# Patient Record
Sex: MALE | Race: WHITE | NOT HISPANIC OR LATINO | Employment: FULL TIME | URBAN - METROPOLITAN AREA
[De-identification: names, ages, dates, MRNs, and addresses within clinical notes are randomized per-mention and may not be internally consistent; named-entity substitution may affect disease eponyms.]

---

## 2017-04-14 PROCEDURE — 302875 HCHG BANDAGE ACE 4 OR 6""

## 2017-04-14 PROCEDURE — 99284 EMERGENCY DEPT VISIT MOD MDM: CPT

## 2017-04-15 ENCOUNTER — HOSPITAL ENCOUNTER (EMERGENCY)
Facility: MEDICAL CENTER | Age: 27
End: 2017-04-15
Attending: EMERGENCY MEDICINE
Payer: MEDICAID

## 2017-04-15 ENCOUNTER — APPOINTMENT (OUTPATIENT)
Dept: RADIOLOGY | Facility: MEDICAL CENTER | Age: 27
End: 2017-04-15
Attending: EMERGENCY MEDICINE
Payer: MEDICAID

## 2017-04-15 VITALS
HEIGHT: 70 IN | RESPIRATION RATE: 16 BRPM | WEIGHT: 192.46 LBS | SYSTOLIC BLOOD PRESSURE: 128 MMHG | TEMPERATURE: 98.8 F | HEART RATE: 78 BPM | DIASTOLIC BLOOD PRESSURE: 87 MMHG | BODY MASS INDEX: 27.55 KG/M2 | OXYGEN SATURATION: 97 %

## 2017-04-15 DIAGNOSIS — S83.004A DISLOCATION OF RIGHT PATELLA, INITIAL ENCOUNTER: ICD-10-CM

## 2017-04-15 PROCEDURE — 73562 X-RAY EXAM OF KNEE 3: CPT | Mod: RT

## 2017-04-15 ASSESSMENT — LIFESTYLE VARIABLES: DO YOU DRINK ALCOHOL: NO

## 2017-04-15 NOTE — ED NOTES
Tech at bedside to apply ace wrap and provide crutches. Pt tolerated well. Teaching instructions provided. Pt voiced understanding.

## 2017-04-15 NOTE — ED NOTES
Discharge instructions given. All questions answered. Pt to follow up with as  needed. Pt verbalizing understanding. All belongings with pt. Pt requesting cab voucher. SW contacted. Pt has Medicaid and can use MTM. Pt ambulated to lobby.

## 2017-04-15 NOTE — ED AVS SNAPSHOT
Home Care Instructions                                                                                                                Maximino Walters   MRN: [id number]    Department:  [facility], Emergency Dept   Date of Visit:  4/14/2017            [facility], Emergency Dept    [address]    [place] [address]    Phone:  [phone]      You were seen by     Luis E Edmondson M.D.      Your Diagnosis Was     Dislocation of right patella, initial encounter     S83.004A       Follow-up Information     1. Schedule an appointment as soon as possible for a visit with Dickson Muniz M.D..    Specialty:  Orthopaedics    Contact information    [address]  [address]  [facility] [address]  [phone]        Medication Information     Review all of your home medications and newly ordered medications with your primary doctor and/or pharmacist as soon as possible. Follow medication instructions as directed by your doctor and/or pharmacist.     Please keep your complete medication list with you and share with your physician. Update the information when medications are discontinued, doses are changed, or new medications (including over-the-counter products) are added; and carry medication information at all times in the event of emergency situations.               Medication List      ASK your doctor about these medications        Instructions    Morning Afternoon Evening Bedtime    ABILIFY 9.75 MG/1.3ML injection   Generic drug:  aripiprazole        by Intramuscular route every 30 days.                        benztropine 1 MG Tabs   Commonly known as:  COGENTIN        Take 1 Tab by mouth every day.   Dose:  1 mg                        clonazepam 0.5 MG Tabs   Commonly known as:  KLONOPIN        Take 1 Tab by mouth 2 times a day.   Dose:  0.5 mg                        diphenhydrAMINE 50 MG Caps   Commonly known as:  BENADRYL        Take 1 Cap by mouth every 8 hours as needed.   Dose:  50  "mg                                Procedures and tests performed during your visit     CRUTCHES    DX-KNEE 3 VIEWS RIGHT    ELASTIC WRAPS 4\"        Discharge Instructions       Patellar Dislocation  A patellar dislocation occurs when your kneecap (patella) slips out of its normal position in a groove in front of the lower end of your thighbone (femur). This groove is called the patellofemoral groove.   CAUSES  The kneecap is normally positioned over the front of the knee joint at the base of the thighbone. A kneecap can be dislocated when:  · The kneecap is out of place (patellar tracking disorder), and force is applied.  · The foot is firmly planted pointing outward, and the knee bends with the thigh turned inward. This kind of injury is common during many sports activities.  · The inner edge of the kneecap is hit, pushing it toward the outer side of the leg.  SIGNS AND SYMPTOMS  · Severe pain.  · A misshapen knee that looks like a bone is out of position.  · A popping sensation, followed by a feeling that something is out of place.  · Inability to bend or straighten the knee.  · Knee swelling.  · Cool, pale skin or numbness and tingling in or below the affected knee.  DIAGNOSIS   Your health care provider will physically examine the injured area. An X-ray exam may be done to make sure a bone fracture has not occurred. In some cases, your health care provider may look inside your knee joint with an instrument much like a pencil-sized telescope (arthroscope). This may be done to make sure you have no loose cartilage in your joint. Loose cartilage is not visible on an X-ray image.  TREATMENT   In many instances, the patella can be guided back into position without much difficulty. It often goes back into position by straightening the leg. Often, nothing more may be needed other than a brief period of immobilization followed by the exercises your health care provider recommends. If patellar dislocation starts to " become frequent after the first incident, surgery may be needed to prevent your patella from slipping out of place.  HOME CARE INSTRUCTIONS   · Only take over-the-counter or prescription medicines for pain, discomfort, or fever as directed by your health care provider.  · Use a knee brace if directed to do so by your health care provider.  · Use crutches as instructed.  · Apply ice to the injured knee:  ¨ Put ice in a plastic bag.  ¨ Place a towel between your skin and the bag.  ¨ Leave the ice on for 20 minutes, 2-3 times a day.  · Follow your health care provider's instructions for doing any recommended range-of-motion exercises or other exercises.  SEEK IMMEDIATE MEDICAL CARE IF:  · You have increased pain or swelling in the knee that is not relieved with medicine.  · You have increasing inflammation in the knee.  · You have locking or catching of your knee.  MAKE SURE YOU:  · Understand these instructions.  · Will watch your condition.  · Will get help right away if you are not doing well or get worse.     This information is not intended to replace advice given to you by your health care provider. Make sure you discuss any questions you have with your health care provider.     Document Released: 09/12/2002 Document Revised: 10/08/2014 Document Reviewed: 07/30/2014  Elsevier Interactive Patient Education ©2016 Elsevier Inc.            Patient Information     Patient Information    Following emergency treatment: all patient requiring follow-up care must return either to a private physician or a clinic if your condition worsens before you are able to obtain further medical attention, please return to the emergency room.     Billing Information    At [facility], we work to make the billing process streamlined for our patients.  Our Representatives are here to answer any questions you may have regarding your hospital bill.  If you have insurance coverage and have supplied your insurance information to us, we will  submit a claim to your insurer on your behalf.  Should you have any questions regarding your bill, we can be reached online or by phone as follows:  Online: You are able pay your bills online or live chat with our representatives about any billing questions you may have. We are here to help Monday - Friday from 8:00am to 7:30pm and 9:00am - 12:00pm on Saturdays.  Please visit [email or web address]  for more information.   Phone:  [phone] or [phone]    Please note that your emergency physician, surgeon, pathologist, radiologist, anesthesiologist, and other specialists are not employed by [facility] and will therefore bill separately for their services.  Please contact them directly for any questions concerning their bills at the numbers below:     Emergency Physician Services:  [phone]  [facility]:  [phone]  [facility]:  [phone]  [facility]:  [phone]    1. Your final bill may vary from the amount quoted upon discharge if all procedures are not complete at that time, or if your doctor has additional procedures of which we are not aware. You will receive an additional bill if you return to the Emergency Department at [facility] for suture removal regardless of the facility of which the sutures were placed.     2. Please arrange for settlement of this account at the emergency registration.    3. All self-pay accounts are due in full at the time of treatment.  If you are unable to meet this obligation then payment is expected within 4-5 days.     4. If you have had radiology studies (CT, X-ray, Ultrasound, MRI), you have received a preliminary result during your emergency department visit. Please contact the radiology department [phone] to receive a copy of your final result. Please discuss the Final result with your primary physician or with the follow up physician provided.     Crisis  Hotline:  National Crisis Hotline:  5-101-WGRUOXU or 1-198.571.7034  [place] Crisis Hotline:    1-926.244.4852 or 293-464-7560         ED Discharge Follow Up Questions    1. In order to provide you with very good care, we would like to follow up with a phone call in the next few days.  May we have your permission to contact you?     YES /  NO    2. What is the best phone number to call you? (       )_____-__________    3. What is the best time to call you?      Morning  /  Afternoon  /  Evening                   Patient Signature:  ____________________________________________________________    Date:  ____________________________________________________________
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
## 2017-04-15 NOTE — ED AVS SNAPSHOT
[employer] Access Code: [email or web address]  Expires: 5/15/2017  3:18 AM    Your email address is not on file at [facility].  Email Addresses are required for you to sign up for [employer], please contact [phone] to verify your personal information and to provide your email address prior to attempting to register for [employer].    Maximino Walters  [address]  [place] [address]    [employer]  A secure, online tool to manage your health information     [facility]’s [employer]® is a secure, online tool that connects you to your personalized health information from the privacy of your home -- day or night - making it very easy for you to manage your healthcare. Once the activation process is completed, you can even access your medical information using the [employer] quentin, which is available for free in the Apple Quentin store or Google Play store.     To learn more about [employer], visit [email or web address]    There are two levels of access available (as shown below):   My Chart Features  [facility] Primary Care Doctor [facility]  Specialists [facility]  Urgent  Care Non-[facility] Primary Care Doctor   Email your healthcare team securely and privately 24/7 X X X    Manage appointments: schedule your next appointment; view details of past/upcoming appointments X      Request prescription refills. X      View recent personal medical records, including lab and immunizations X X X X   View health record, including health history, allergies, medications X X X X   Read reports about your outpatient visits, procedures, consult and ER notes X X X X   See your discharge summary, which is a recap of your hospital and/or ER visit that includes your diagnosis, lab results, and care plan X X  X     How to register for [employer]:  Once your e-mail address has been verified, follow the following steps to sign up for [employer].     1. Go to  [email or web address]  2. Click on the Sign Up Now box, which takes you to the New Member Sign Up page. You  will need to provide the following information:  a. Enter your [employer] Access Code exactly as it appears at the top of this page. (You will not need to use this code after you’ve completed the sign-up process. If you do not sign up before the expiration date, you must request a new code.)   b. Enter your date of birth.   c. Enter your home email address.   d. Click Submit, and follow the next screen’s instructions.  3. Create a Make [employer] ID. This will be your [employer] login ID and cannot be changed, so think of one that is secure and easy to remember.  4. Create a [employer] password. You can change your password at any time.  5. Enter your Password Reset Question and Answer. This can be used at a later time if you forget your password.   6. Enter your e-mail address. This allows you to receive e-mail notifications when new information is available in [employer].  7. Click Sign Up. You can now view your health information.    For assistance activating your [employer] account, call [phone]

## 2017-04-15 NOTE — DISCHARGE INSTRUCTIONS
Patellar Dislocation  A patellar dislocation occurs when your kneecap (patella) slips out of its normal position in a groove in front of the lower end of your thighbone (femur). This groove is called the patellofemoral groove.   CAUSES  The kneecap is normally positioned over the front of the knee joint at the base of the thighbone. A kneecap can be dislocated when:  · The kneecap is out of place (patellar tracking disorder), and force is applied.  · The foot is firmly planted pointing outward, and the knee bends with the thigh turned inward. This kind of injury is common during many sports activities.  · The inner edge of the kneecap is hit, pushing it toward the outer side of the leg.  SIGNS AND SYMPTOMS  · Severe pain.  · A misshapen knee that looks like a bone is out of position.  · A popping sensation, followed by a feeling that something is out of place.  · Inability to bend or straighten the knee.  · Knee swelling.  · Cool, pale skin or numbness and tingling in or below the affected knee.  DIAGNOSIS   Your health care provider will physically examine the injured area. An X-ray exam may be done to make sure a bone fracture has not occurred. In some cases, your health care provider may look inside your knee joint with an instrument much like a pencil-sized telescope (arthroscope). This may be done to make sure you have no loose cartilage in your joint. Loose cartilage is not visible on an X-ray image.  TREATMENT   In many instances, the patella can be guided back into position without much difficulty. It often goes back into position by straightening the leg. Often, nothing more may be needed other than a brief period of immobilization followed by the exercises your health care provider recommends. If patellar dislocation starts to become frequent after the first incident, surgery may be needed to prevent your patella from slipping out of place.  HOME CARE INSTRUCTIONS   · Only take over-the-counter or  prescription medicines for pain, discomfort, or fever as directed by your health care provider.  · Use a knee brace if directed to do so by your health care provider.  · Use crutches as instructed.  · Apply ice to the injured knee:  ¨ Put ice in a plastic bag.  ¨ Place a towel between your skin and the bag.  ¨ Leave the ice on for 20 minutes, 2-3 times a day.  · Follow your health care provider's instructions for doing any recommended range-of-motion exercises or other exercises.  SEEK IMMEDIATE MEDICAL CARE IF:  · You have increased pain or swelling in the knee that is not relieved with medicine.  · You have increasing inflammation in the knee.  · You have locking or catching of your knee.  MAKE SURE YOU:  · Understand these instructions.  · Will watch your condition.  · Will get help right away if you are not doing well or get worse.     This information is not intended to replace advice given to you by your health care provider. Make sure you discuss any questions you have with your health care provider.     Document Released: 09/12/2002 Document Revised: 10/08/2014 Document Reviewed: 07/30/2014  [employer] Interactive Patient Education ©2016 [employer] Inc.

## 2017-04-15 NOTE — ED AVS SNAPSHOT
4/15/2017    Maximino Walters  [address]  [place] [address]    Dear Maximino:    [facility] wants to ensure your discharge home is safe and you or your loved ones have had all of your questions answered regarding your care after you leave the hospital.    Below is a list of resources and contact information should you have any questions regarding your hospital stay, follow-up instructions, or active medical symptoms.    Questions or Concerns Regarding… Contact   Medical Questions Related to Your Discharge  (7 days a week, 8am-5pm) Contact a Nurse Care Coordinator   [phone]   Medical Questions Not Related to Your Discharge  (24 hours a day / 7 days a week)  Contact the Nurse Health Line   [phone]    Medications or Discharge Instructions Refer to your discharge packet   or contact your [facility] Primary Care Provider   [phone]   Follow-up Appointment(s) Schedule your appointment via [employer]   or contact Scheduling [phone]   Billing Review your statement via [employer]  or contact Billing [phone]   Medical Records Review your records via [employer]   or contact Medical Records [phone]     You may receive a telephone call within two days of discharge. This call is to make certain you understand your discharge instructions and have the opportunity to have any questions answered. You can also easily access your medical information, test results and upcoming appointments via the [employer] free online health management tool. You can learn more and sign up at [employer]. For assistance setting up your [employer] account, please call [phone].    Once again, we want to ensure your discharge home is safe and that you have a clear understanding of any next steps in your care. If you have any questions or concerns, please do not hesitate to contact us, we are here for you. Thank you for choosing [facility] for your healthcare needs.    Sincerely,    Your [facility] Healthcare Team
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
## 2020-06-07 ENCOUNTER — OCCUPATIONAL MEDICINE (OUTPATIENT)
Dept: URGENT CARE | Facility: PHYSICIAN GROUP | Age: 30
End: 2020-06-07
Payer: COMMERCIAL

## 2020-06-07 VITALS
BODY MASS INDEX: 25.34 KG/M2 | DIASTOLIC BLOOD PRESSURE: 88 MMHG | TEMPERATURE: 98.4 F | HEART RATE: 84 BPM | HEIGHT: 70 IN | WEIGHT: 177 LBS | RESPIRATION RATE: 16 BRPM | SYSTOLIC BLOOD PRESSURE: 134 MMHG | OXYGEN SATURATION: 98 %

## 2020-06-07 DIAGNOSIS — S39.012A STRAIN OF LUMBAR REGION, INITIAL ENCOUNTER: ICD-10-CM

## 2020-06-07 PROCEDURE — 99203 OFFICE O/P NEW LOW 30 MIN: CPT | Performed by: PHYSICIAN ASSISTANT

## 2020-06-07 RX ORDER — CYCLOBENZAPRINE HCL 10 MG
10 TABLET ORAL 3 TIMES DAILY PRN
Qty: 30 TAB | Refills: 0 | Status: SHIPPED | OUTPATIENT
Start: 2020-06-07 | End: 2020-08-09

## 2020-06-07 ASSESSMENT — ENCOUNTER SYMPTOMS
DIARRHEA: 0
CHILLS: 0
DIZZINESS: 0
VOMITING: 0
EYE DISCHARGE: 0
SHORTNESS OF BREATH: 0
FEVER: 0
ABDOMINAL PAIN: 0
EYE REDNESS: 0
NAUSEA: 0

## 2020-06-07 NOTE — LETTER
[facility]  [address] - [place] [address]  Phone:  [phone] - Fax:  [phone]   [facility] Progress Report and Disability Certification  Date of Service: 6/7/2020   No Show:  No  Date / Time of Next Visit: 6/12/2020@8:00AM   Claim Information   Patient Name: Maximino Walters  Claim Number:     Employer: [employer]  Date of Injury: 6/3/2020     Insurer / TPA: Jluis Berea  ID / SSN:     Occupation: /  Diagnosis: The encounter diagnosis was Strain of lumbar region, initial encounter.    Medical Information   Related to Industrial Injury? Yes    Subjective Complaints:  DOI: 6/3/20. Patient presents to urgent care reporting right lower back pain after he was crouching and reaching forwards for an object, he felt a sudden, sharp pain in right low back. He didn't fall the to ground, no trauma to the area. He reports sharp pain and spasms with certain movements since the incident. Pain has been gradually worsening in severity.  No history of prior back injuries. He denies bowel/bladder incontinence, radiating pain down the legs, urinary symptoms, or distal numbness/tingling. He has been taking OTC nsaids with good relief.    Objective Findings: Musculoskeletal:      Lumbar back: He exhibits decreased range of motion, tenderness and pain. He exhibits no bony tenderness.      Comments: No midline spinal tenderness or step of deformities noted. Tenderness of right lumbar paraspinal musculature. Straight leg raise negative.    Pre-Existing Condition(s):     Assessment:   Initial Visit    Status: Additional Care Required  Permanent Disability:No    Plan: MedicationMedication (NOT at Work)    Diagnostics:      Comments:       Disability Information   Status: Released to Restricted Duty    From:  6/7/2020  Through: 6/12/2020 Restrictions are: Temporary   Physical Restrictions   Sitting:    Standing:    Stooping:  < or = to 1 hr/day  Bending:  < or = to 1 hr/day   Squatting:    Walking:    Climbing:    Pushing:  < or = to 1 hr/day   Pulling:  < or = to 1 hr/day Other:    Reaching Above Shoulder (L):   Reaching Above Shoulder (R):       Reaching Below Shoulder (L):    Reaching Below Shoulder (R):      Not to exceed Weight Limits   Carrying(hrs):   Weight Limit(lb): < or = to 10 pounds Lifting(hrs):   Weight  Limit(lb): < or = to 10 pounds   Comments: Encouraged icing 2-3 times daily followed by gentle massage and stretching  OTC nsaids as needed for pain  Flexeril nightly as needed for low back stiffness and spasms, Will cause sedation, avoid driving, operating heavy machinery, and drinking alcohol  RTC in 4-5 days for follow up    Repetitive Actions   Hands: i.e. Fine Manipulations from Grasping:     Feet: i.e. Operating Foot Controls:     Driving / Operate Machinery:     Physician Name: Sharon Salazar P.A.-C. Physician Signature: SHARON Lucia P.A.-C. e-Signature: Dr. Trevon Middleton, Medical Director   Clinic Name / Location: [address]  [place] [address] Clinic Phone Number: Dept: [phone]   Appointment Time: 1:30 Pm Visit Start Time: 1:48 PM   Check-In Time:  1:46 Pm Visit Discharge Time:  2:20PM   Original-Treating Physician or Chiropractor    Page 2-Insurer/TPA    Page 3-Employer    Page 4-Employee

## 2020-06-07 NOTE — PROGRESS NOTES
"Subjective:      Maximino Walters is a 29 y.o. male who presents with Work-Related Injury (Back Injury, lower back pain, difficulty bending or crouching, pt was crouching down when he suddenly felt a sharp pain and back spasms, x5 days  DOI 6/3/2020 )      DOI: 6/3/20. Patient presents to urgent care reporting right lower back pain after he was crouching and reaching forwards for an object, he felt a sudden, sharp pain in right low back. He didn't fall the to ground, no trauma to the area. He reports sharp pain and spasms with certain movements since the incident. Pain has been gradually worsening in severity.  No history of prior back injuries. He denies bowel/bladder incontinence, radiating pain down the legs, urinary symptoms, or distal numbness/tingling. He has been taking OTC nsaids with good relief.      HPI    Review of Systems   Constitutional: Negative for chills and fever.   HENT: Negative for congestion.    Eyes: Negative for discharge and redness.   Respiratory: Negative for shortness of breath.    Cardiovascular: Negative for chest pain.   Gastrointestinal: Negative for abdominal pain, diarrhea, nausea and vomiting.   Genitourinary: Negative.    Musculoskeletal:        + back pain   Skin: Negative for rash.   Neurological: Negative for dizziness.        Objective:     /88 (BP Location: Left arm, Patient Position: Sitting, BP Cuff Size: Adult)   Pulse 84   Temp 36.9 °C (98.4 °F) (Temporal)   Resp 16   Ht 1.778 m (5' 10\")   Wt 80.3 kg (177 lb)   SpO2 98%   BMI 25.40 kg/m²        Physical Exam  Vitals signs and nursing note reviewed.   Constitutional:       Appearance: Normal appearance. He is well-developed.   HENT:      Head: Normocephalic and atraumatic.      Nose: Nose normal.   Eyes:      Pupils: Pupils are equal, round, and reactive to light.   Neck:      Musculoskeletal: Normal range of motion.   Cardiovascular:      Rate and Rhythm: Normal rate and regular rhythm.   Pulmonary:      Effort: " Pulmonary effort is normal.   Musculoskeletal:      Lumbar back: He exhibits decreased range of motion, tenderness and pain. He exhibits no bony tenderness.      Comments: No midline spinal tenderness or step of deformities noted. Tenderness of right lumbar paraspinal musculature. Straight leg raise negative.    Skin:     General: Skin is warm and dry.   Neurological:      Mental Status: He is alert and oriented to person, place, and time.   Psychiatric:         Behavior: Behavior normal.                 PMH:  has a past medical history of Psychiatric disorder.  MEDS:   Current Outpatient Medications:   •  cyclobenzaprine (FLEXERIL) 10 MG Tab, Take 1 Tab by mouth 3 times a day as needed., Disp: 30 Tab, Rfl: 0  •  aripiprazole (ABILIFY) 9.75 MG/1.3ML injection, by Intramuscular route every 30 days., Disp: , Rfl:   •  diphenhydrAMINE (BENADRYL) 50 MG CAPS, Take 1 Cap by mouth every 8 hours as needed., Disp: 9 Cap, Rfl: 0  •  clonazepam (KLONOPIN) 0.5 MG TABS, Take 1 Tab by mouth 2 times a day., Disp: 10 Tab, Rfl: 0  •  benztropine (COGENTIN) 1 MG TABS, Take 1 Tab by mouth every day., Disp: 60 Each, Rfl: 0  ALLERGIES: No Known Allergies  SURGHX:   Past Surgical History:   Procedure Laterality Date   • OTHER      right knee ( torn miniscus )     SOCHX:  reports that he has quit smoking. He has never used smokeless tobacco. He reports current alcohol use. He reports that he does not use drugs.  FH: family history is not on file.    Assessment/Plan:       1. Strain of lumbar region, initial encounter    - cyclobenzaprine (FLEXERIL) 10 MG Tab; Take 1 Tab by mouth 3 times a day as needed.  Dispense: 30 Tab; Refill: 0    Encouraged icing 2-3 times daily followed by gentle massage and stretching   OTC nsaids as needed for pain   Flexeril nightly as needed for low back stiffness and spasms, Will cause sedation, avoid driving, operating heavy machinery, and drinking alcohol   RTC in 4-5 days for follow up

## 2020-06-07 NOTE — LETTER
"EMPLOYEE’S CLAIM FOR COMPENSATION/ REPORT OF INITIAL TREATMENT  FORM C-4    EMPLOYEE’S CLAIM - PROVIDE ALL INFORMATION REQUESTED   First Name  Maximino Last Name  Romeo Birthdate                    1990                Sex  male Claim Number   Home Address  [address] Age  29 y.o. Height  1.778 m (5' 10\") Weight  80.3 kg (177 lb) [facility]     [facility] Zip  [address] Telephone  [phone] (home)    Mailing Address  [address] [facility] Zip  [address] Primary Language Spoken  English    Insurer   Third Party   Bill/rosita Vallejo   Employee's Occupation (Job Title) When Injury or Occupational Disease Occurred  /    Employer's Name  [employer]  Telephone  [phone]    Employer Address  [address]  Zip  [address]   Date of Injury  6/3/2020               Hour of Injury  1:00 PM Date Employer Notified  6/7/2020 Last Day of Work after Injury or Occupational Disease  6/3/2020 Supervisor to Whom Injury Reported  Michael Bailey   Address or Location of Accident (if applicable)  [[address] [facility], [address]]   What were you doing at the time of accident? (if applicable)  picking,crouching,reaching into bin    How did this injury or occupational disease occur? (Be specific an answer in detail. Use additional sheet if necessary)  while picking, crouched down and reaching into bin for item, felt spasm/sharp pain in lower back.  worked through remainder of shift, as pain was not severe or persistent unitl the following days.   If you believe that you have an occupational disease, when did you first have knowledge of the disability and it relationship to your employment?  N/A Witnesses to the Accident  N/A      Nature of Injury or Occupational Disease  Strain  Part(s) of Body Injured or Affected  Lower Back Area (Lumbar Area & Lumbo-Sacral), " N/A, N/A    I certify that the above is true and correct to the best of my knowledge and that I have provided this information in order to obtain the benefits of [place]  I hereby authorize any physician, chiropractor, surgeon, practitioner, or other person, any hospital, including [facility], any medical service organization, any insurance company, or other institution or organization to release to each other, any medical or other information, including benefits paid or payable, pertinent to this injury or disease, except information relative to diagnosis, treatment and/or counseling for AIDS, psychological conditions, alcohol or controlled substances, for which I must give specific authorization.  A Photostat of this authorization shall be as valid as the original.     Date   Place   Employee’s Signature   THIS REPORT MUST BE COMPLETED AND MAILED WITHIN 3 WORKING DAYS OF TREATMENT   Place  [facility]  Name of Facility  [facility]   Date  6/7/2020 Diagnosis  (S39.012A) Strain of lumbar region, initial encounter Is there evidence the injured employee was under the influence of alcohol and/or another controlled substance at the time of accident?   Hour  1:48 PM Description of Injury or Disease  The encounter diagnosis was Strain of lumbar region, initial encounter. No   Treatment  Encouraged icing 2-3 times daily followed by gentle massage and stretching   OTC nsaids as needed for pain   Flexeril nightly as needed for low back stiffness and spasms, Will cause sedation, avoid driving, operating heavy machinery, and drinking alcohol   RTC in 4-5 days for follow up   Have you advised the patient to remain off work five days or more? No   X-Ray Findings      If Yes   From Date  To Date      From information given by the employee, together with medical evidence,  "can you directly connect this injury or occupational disease as job incurred?  Yes If No Full Duty No Modified Duty  Yes   Is additional medical care by a physician indicated?  Yes If Modified Duty, Specify any Limitations / Restrictions    Stooping: < or = to 1 hr/day  Bending: < or = to 1 hr/day      Pushing: < or = to 1 hr/day   Pulling: < or = to 1 hr/day      Repetitive Actions   Not To Exceed Weight Limits    Weight Limit (LB): < or = to 10 pounds   Weight Limit (LB): < or = to 10 pounds    Do you know of any previous injury or disease contributing to this condition or occupational disease?                            No   Date  6/7/2020 Print Doctor’s Name Sharon Salazar P.A.-C. I certify the employer’s copy of  this form was mailed on:   Address  [address] [place] [address]. [address] Insurer’s Use Only     [facility]  [id number]    Provider’s Tax ID Number  [id number] Telephone  Dept: [phone]        e-SHARON King P.A.-C.   e-Signature: Dr. Trevon Middleton,   Medical Director Degree  MD        ORIGINAL-TREATING PHYSICIAN OR CHIROPRACTOR    PAGE 2-INSURER/TPA    PAGE 3-EMPLOYER    PAGE 4-EMPLOYEE             Form C-4 (rev.10/07)              BRIEF DESCRIPTION OF RIGHTS AND BENEFITS  (Pursuant to [place] 616[place])    Notice of Injury or Occupational Disease (Incident Report Form C-1): If an injury or occupational disease (OD) arises out of and in the course of employment, you must provide written notice to your employer as soon as practicable, but no later than 7 days after the accident or OD. Your employer shall maintain a sufficient supply of the required forms.    Claim for Compensation (Form C-4): If medical treatment is sought, the form C-4 is available at the place of initial treatment. A completed \"Claim for Compensation\" (Form C-4) must be filed within 90 days after an accident or OD. The treating physician or chiropractor must, within 3 working days after treatment, complete and mail to " the employer, the employer's insurer and third-party , the Claim for Compensation.    Medical Treatment: If you require medical treatment for your on-the-job injury or OD, you may be required to select a physician or chiropractor from a list provided by your workers’ compensation insurer, if it has contracted with an Organization for Managed Care (MCO) or Preferred Provider Organization (PPO) or providers of health care. If your employer has not entered into a contract with an MCO or PPO, you may select a physician or chiropractor from the Panel of Physicians and Chiropractors. Any medical costs related to your industrial injury or OD will be paid by your insurer.    Temporary Total Disability (TTD): If your doctor has certified that you are unable to work for a period of at least 5 consecutive days, or 5 cumulative days in a 20-day period, or places restrictions on you that your employer does not accommodate, you may be entitled to TTD compensation.    Temporary Partial Disability (TPD): If the wage you receive upon reemployment is less than the compensation for TTD to which you are entitled, the insurer may be required to pay you TPD compensation to make up the difference. TPD can only be paid for a maximum of 24 months.    Permanent Partial Disability (PPD): When your medical condition is stable and there is an indication of a PPD as a result of your injury or OD, within 30 days, your insurer must arrange for an evaluation by a rating physician or chiropractor to determine the degree of your PPD. The amount of your PPD award depends on the date of injury, the results of the PPD evaluation and your age and wage.    Permanent Total Disability (PTD): If you are medically certified by a treating physician or chiropractor as permanently and totally disabled and have been granted a PTD status by your insurer, you are entitled to receive monthly benefits not to exceed 66 2/3% of your average monthly wage.  The amount of your PTD payments is subject to reduction if you previously received a PPD award.    Vocational Rehabilitation Services: You may be eligible for vocational rehabilitation services if you are unable to return to the job due to a permanent physical impairment or permanent restrictions as a result of your injury or occupational disease.    Transportation and Per Liz Reimbursement: You may be eligible for travel expenses and per liz associated with medical treatment.    Reopening: You may be able to reopen your claim if your condition worsens after claim closure.    Appeal Process: If you disagree with a written determination issued by the insurer or the insurer does not respond to your request, you may appeal to the Department of Administration, , by following the instructions contained in your determination letter. You must appeal the determination within 70 days from the date of the determination letter at [address], [address], [place], [place] [address], or [address], [address], [place] [address]. If you disagree with the  decision, you may appeal to the Department of Administration, . You must file your appeal within 30 days from the date of the  decision letter at [address], [address], [place], [place] [address], or [address], [address], [place], [place] [address]. If you disagree with a decision of an , you may file a petition for judicial review with the District Court. You must do so within 30 days of the Appeal Officer’s decision. You may be represented by an  at your own expense or you may contact the [facility] for possible representation.    [place]  for Injured Workers (NAIW): If you disagree with a  decision, you may request that NAIW represent you without charge at an  Hearing. For information regarding denial of benefits, you  may contact the [facility] at: [address], [address], [place] [address], [phone], or [address], [address], [place] [address], [phone]    To File a Complaint with the Division: If you wish to file a complaint with the  of the Division of Industrial Relations (DIR),  please contact the Workers’ Compensation Section, [address], [address], [place], [place] [address], telephone [phone]) [phone], or [address], [address], [place], [place] [address], telephone [phone].    For assistance with Workers’ Compensation Issues: You may contact the Office of the Governor Consumer Health Assistance, [address] [place] [address], [address], [place], [place] [address], Toll Free 1-494.761.8146, Web site: http://govcha.[facility].nv., E-mail [email or web address].nv.                   __________________________________________________________________                                                     _________        Employee Name / Signature                                                                                                                                              Date                                                                                                                                                                                                     D-2 (rev. 06/18)

## 2020-06-09 ENCOUNTER — OCCUPATIONAL MEDICINE (OUTPATIENT)
Dept: URGENT CARE | Facility: PHYSICIAN GROUP | Age: 30
End: 2020-06-09
Payer: COMMERCIAL

## 2020-06-09 ENCOUNTER — APPOINTMENT (OUTPATIENT)
Dept: URGENT CARE | Facility: PHYSICIAN GROUP | Age: 30
End: 2020-06-09
Payer: COMMERCIAL

## 2020-06-09 VITALS
TEMPERATURE: 98.2 F | RESPIRATION RATE: 16 BRPM | SYSTOLIC BLOOD PRESSURE: 124 MMHG | WEIGHT: 177 LBS | OXYGEN SATURATION: 100 % | DIASTOLIC BLOOD PRESSURE: 86 MMHG | HEIGHT: 70 IN | HEART RATE: 122 BPM | BODY MASS INDEX: 25.34 KG/M2

## 2020-06-09 DIAGNOSIS — S39.012D STRAIN OF LUMBAR REGION, SUBSEQUENT ENCOUNTER: ICD-10-CM

## 2020-06-09 PROCEDURE — 99214 OFFICE O/P EST MOD 30 MIN: CPT | Performed by: PHYSICIAN ASSISTANT

## 2020-06-09 RX ORDER — METHYLPREDNISOLONE 4 MG/1
TABLET ORAL
Qty: 1 PACKAGE | Refills: 0 | Status: SHIPPED | OUTPATIENT
Start: 2020-06-09 | End: 2020-06-20

## 2020-06-09 ASSESSMENT — ENCOUNTER SYMPTOMS
BACK PAIN: 1
SHORTNESS OF BREATH: 0
NAUSEA: 0
ABDOMINAL PAIN: 0
DIARRHEA: 0
DIZZINESS: 0
CHILLS: 0
VOMITING: 0
FEVER: 0

## 2020-06-09 ASSESSMENT — PAIN SCALES - GENERAL: PAINLEVEL: 3=SLIGHT PAIN

## 2020-06-09 NOTE — LETTER
[facility]  [address] - [place] [address]  Phone:  [phone] - Fax:  [phone]   Occ[facility] Progress Report and Disability Certification  Date of Service: 6/9/2020   No Show:  No  Date / Time of Next Visit: 6/16/2020 @ 6:15 PM   Claim Information   Patient Name: Maximino Walters  Claim Number:     Employer: [employer]  Date of Injury: 6/3/2020     Insurer / TPA: Jluis Houstonia  ID / SSN:     Occupation: /  Diagnosis: The encounter diagnosis was Strain of lumbar region, subsequent encounter.    Medical Information   Related to Industrial Injury? Yes    Subjective Complaints:  DOI: 6/3/20. Patient developed right lower back pain after he was crouching and reaching forwards for an object, he felt a sudden, sharp pain in right low back. He didn't fall the to ground, no trauma to the area. He reports sharp pain and spasms with certain movements since the incident.  No history of prior back injuries. He returns today for follow up reporting worsening pain, described as sharp and worsened with all movement. No bowel/bladder incontinence, radiating pain down the legs, or distal numbness/tingling. He has been taking Flexeril nightly without significant relief of symptoms. He has also tried taking OTC tylenol without significant relief.    Objective Findings: Musculoskeletal:      Lumbar back: He exhibits decreased range of motion, tenderness, pain and spasm. He exhibits no bony tenderness.      Comments: No midline spinal tenderness or step off deformities noted. Straight leg raise negative. TTP over right lumbar paraspinal musculature.     Pre-Existing Condition(s): None    Assessment:   Condition Worsened    Status: Additional Care Required  Permanent Disability:No    Plan: MedicationMedication (NOT at Work)    Diagnostics:      Comments:       Disability Information   Status: Released to Restricted Duty    From:   6/9/2020  Through: 6/16/2020 Restrictions are: Temporary   Physical Restrictions   Sitting:    Standing:    Stooping:  < or = to 1 hr/day Bending:  < or = to 1 hr/day   Squatting:  < or = to 1 hr/day Walking:    Climbing:    Pushing:  < or = to 1 hr/day   Pulling:  < or = to 1 hr/day Other:    Reaching Above Shoulder (L):   Reaching Above Shoulder (R):       Reaching Below Shoulder (L):    Reaching Below Shoulder (R):      Not to exceed Weight Limits   Carrying(hrs):   Weight Limit(lb): < or = to 10 pounds Lifting(hrs):   Weight  Limit(lb): < or = to 10 pounds   Comments: Medrol dosepak provided at today's visit  Encouraged icing/heating 2-3 times daily followed by gentle massage and stretching  Continue taking flexeril nightly as needed, Will cause sedation, avoid driving, operating heavy machinery, and drinking alcohol  OTC tylenol or ibuprofen as needed for pain  RTC in 1 week for follow up    Repetitive Actions   Hands: i.e. Fine Manipulations from Grasping:     Feet: i.e. Operating Foot Controls:     Driving / Operate Machinery:     Physician Name: Sharon Salazar P.A.-C. Physician Signature: SHARON Lucia P.A.-C. e-Signature: Dr. Trevon Middleton, Medical Director   Clinic Name / Location: [address]  [place] [address] Clinic Phone Number: Dept: [phone]   Appointment Time: 6:20 Pm Visit Start Time: 6:19 PM   Check-In Time:  6:16 Pm Visit Discharge Time:  6:57 PM   Original-Treating Physician or Chiropractor    Page 2-Insurer/TPA    Page 3-Employer    Page 4-Employee

## 2020-06-10 NOTE — PROGRESS NOTES
"Subjective:      Maximino Walters is a 29 y.o. male who presents with Work-Related Injury (low back pain.  Pt states he is feeling worse.)      DOI: 6/3/20. Patient developed right lower back pain after he was crouching and reaching forwards for an object, he felt a sudden, sharp pain in right low back. He didn't fall the to ground, no trauma to the area. He reports sharp pain and spasms with certain movements since the incident.  No history of prior back injuries. He returns today for follow up reporting worsening pain, described as sharp and worsened with all movement. No bowel/bladder incontinence, radiating pain down the legs, or distal numbness/tingling. He has been taking Flexeril nightly without significant relief of symptoms. He has also tried taking OTC tylenol without significant relief.        HPI    Review of Systems   Constitutional: Negative for chills and fever.   HENT: Negative for congestion.    Respiratory: Negative for shortness of breath.    Cardiovascular: Negative for chest pain.   Gastrointestinal: Negative for abdominal pain, diarrhea, nausea and vomiting.   Genitourinary: Negative.    Musculoskeletal: Positive for back pain.   Skin: Negative for rash.   Neurological: Negative for dizziness.        Objective:     /86   Pulse (!) 122   Temp 36.8 °C (98.2 °F) (Temporal)   Resp 16   Ht 1.778 m (5' 10\")   Wt 80.3 kg (177 lb)   SpO2 100%   BMI 25.40 kg/m²      Physical Exam  Vitals signs and nursing note reviewed.   Constitutional:       Appearance: Normal appearance. He is well-developed.   HENT:      Head: Normocephalic and atraumatic.      Right Ear: External ear normal.      Left Ear: External ear normal.      Nose: Nose normal.   Eyes:      Pupils: Pupils are equal, round, and reactive to light.   Neck:      Musculoskeletal: Normal range of motion.   Cardiovascular:      Rate and Rhythm: Normal rate and regular rhythm.   Pulmonary:      Effort: Pulmonary effort is normal. "   Musculoskeletal:      Lumbar back: He exhibits decreased range of motion, tenderness, pain and spasm. He exhibits no bony tenderness.      Comments: No midline spinal tenderness or step off deformities noted. Straight leg raise negative. TTP over right lumbar paraspinal musculature.    Skin:     General: Skin is warm and dry.   Neurological:      Mental Status: He is alert and oriented to person, place, and time.   Psychiatric:         Behavior: Behavior normal.                 Assessment/Plan:       1. Strain of lumbar region, subsequent encounter    - methylPREDNISolone (MEDROL DOSEPAK) 4 MG Tablet Therapy Pack; Take as directed  Dispense: 1 Package; Refill: 0    Medrol dosepak provided at today's visit  Encouraged icing/heating 2-3 times daily followed by gentle massage and stretching  Continue taking flexeril nightly as needed, Will cause sedation, avoid driving, operating heavy machinery, and drinking alcohol  OTC tylenol or ibuprofen as needed for pain  RTC in 1 week for follow up

## 2020-06-16 ENCOUNTER — OCCUPATIONAL MEDICINE (OUTPATIENT)
Dept: URGENT CARE | Facility: PHYSICIAN GROUP | Age: 30
End: 2020-06-16
Payer: COMMERCIAL

## 2020-06-16 VITALS
OXYGEN SATURATION: 96 % | RESPIRATION RATE: 16 BRPM | SYSTOLIC BLOOD PRESSURE: 114 MMHG | WEIGHT: 183.4 LBS | HEART RATE: 120 BPM | TEMPERATURE: 99.7 F | HEIGHT: 70 IN | DIASTOLIC BLOOD PRESSURE: 72 MMHG | BODY MASS INDEX: 26.26 KG/M2

## 2020-06-16 DIAGNOSIS — S39.012D STRAIN OF LUMBAR REGION, SUBSEQUENT ENCOUNTER: ICD-10-CM

## 2020-06-16 PROCEDURE — 99214 OFFICE O/P EST MOD 30 MIN: CPT | Performed by: PHYSICIAN ASSISTANT

## 2020-06-16 ASSESSMENT — ENCOUNTER SYMPTOMS
BACK PAIN: 1
TINGLING: 0
SENSORY CHANGE: 0
FOCAL WEAKNESS: 0

## 2020-06-16 NOTE — LETTER
[facility]  [address] - [place] [address]  Phone:  [phone] - Fax:  [phone]   [facility] Progress Report and Disability Certification  Date of Service: 6/16/2020   No Show:  No  Date / Time of Next Visit: 6/20/2020 @ 12:00 PM   Claim Information   Patient Name: Maximino Walters  Claim Number:     Employer: [employer]  Date of Injury: 6/3/2020     Insurer / TPA: Jluis Corona  ID / SSN:     Occupation: /  Diagnosis: The encounter diagnosis was Strain of lumbar region, subsequent encounter.    Medical Information   Related to Industrial Injury? Yes    Subjective Complaints:  DOI: 6/3/20.  Patient returns today for second follow-up visit (third visit total).  Patient developed right lower back pain after he was crouching and reaching forwards for an object, he felt a sudden, sharp pain in right low back.  The patient was initially given Flexeril at his initial visit.  Last visit he was prescribed Medrol Dosepak.  Patient reports approximate 80% improvement in symptoms.  Patient reports continued periodic pain in particular with forward bending.  Pain is mostly located in the right low back.  Pain is nonradiating.  Patient denies any weakness, numbness or tingling of the extremities.  Denies bowel or bladder incontinence or unexplained fevers.  Patient has completed his full course of Medrol Dosepak and is no longer taking Flexeril.  He is no longer taking anything for pain.  The patient reports that he has not been able to perform work as they are unable to accommodate his work restrictions.     Objective Findings: Lumbar spine: No spinous process tenderness, step-off or deformity noted.  Mild tenderness right lumbar paravertebral muscles without spasm noted.  Negative straight leg raise.  Patient does exhibit some pain with rotation to the right as well as forward bend.  Motor strength bilateral lower extremities 5/5.   Sensation grossly intact bilateral lower extremities.  DTRs 2+ equal bilateral lower extremities.   Pre-Existing Condition(s):     Assessment:   Condition Improved    Status: Additional Care Required  Permanent Disability:No    Plan:      Diagnostics:      Comments:       Disability Information   Status: Released to Restricted Duty    From:  2020  Through: 2020 Restrictions are: Temporary   Physical Restrictions   Sitting:    Standing:    Stooping:  < or = to 4 hrs/day Bending:  < or = to 2 hrs/day   Squatting:  < or = to 2 hrs/day Walking:    Climbin hrs/day Pushing:  < or = to 4 hrs/day   Pulling:  < or = to 4 hrs/day Other:    Reaching Above Shoulder (L):   Reaching Above Shoulder (R):       Reaching Below Shoulder (L):    Reaching Below Shoulder (R):      Not to exceed Weight Limits   Carrying(hrs):   Weight Limit(lb): < or = to 10 pounds Lifting(hrs):   Weight  Limit(lb): < or = to 10 pounds   Comments: The patient has not been working and I am hesitant to release him to full duty without a trial of increased activity.  Therefore, we will lift work restrictions as noted above and see the patient back on 20 for reevaluation with consideration for release at [facility] if continued improvement.  Patient can continue to utilize over-the-counter ibuprofen as needed for pain not to exceed recommended daily dose, moist heat, gentle stretching and walking.    Repetitive Actions   Hands: i.e. Fine Manipulations from Grasping:     Feet: i.e. Operating Foot Controls:     Driving / Operate Machinery:     Physician Name: Brianna Guillen P.A.-C. Physician Signature:   e-Signature: Dr. Trevon Middleton, Medical Director   Clinic Name / Location: [address]  [place] [address] Clinic Phone Number: Dept: [phone]   Appointment Time: 6:15 Pm Visit Start Time: 6:30 PM   Check-In Time:  6:03 Pm Visit Discharge Time: 7:21 PM   Original-Treating Physician or  Chiropractor    Page 2-Insurer/TPA    Page 3-Employer    Page 4-Employee

## 2020-06-17 NOTE — PROGRESS NOTES
"Subjective:   Maximino Walters  is a 29 y.o. male who presents for Work-Related Injury (Lower back injury, pt states they feel minor pain but getting better. )    DOI: 6/3/20.  Patient returns today for second follow-up visit (third visit total).  Patient developed right lower back pain after he was crouching and reaching forwards for an object, he felt a sudden, sharp pain in right low back.  The patient was initially given Flexeril at his initial visit.  Last visit he was prescribed Medrol Dosepak.  Patient reports approximate 80% improvement in symptoms.  Patient reports continued periodic pain in particular with forward bending.  Pain is mostly located in the right low back.  Pain is nonradiating.  Patient denies any weakness, numbness or tingling of the extremities.  Denies bowel or bladder incontinence or unexplained fevers.  Patient has completed his full course of Medrol Dosepak and is no longer taking Flexeril.  He is no longer taking anything for pain.  The patient reports that he has not been able to perform work as they are unable to accommodate his work restrictions.     HPI  Review of Systems   Musculoskeletal: Positive for back pain.   Neurological: Negative for tingling, sensory change and focal weakness.   All other systems reviewed and are negative.    No Known Allergies  Reviewed past medical, surgical , social and family history.  Reviewed prescription and over-the-counter medications with patient and electronic health record today.     Objective:   /72 (BP Location: Left arm, Patient Position: Sitting, BP Cuff Size: Adult)   Pulse (!) 120   Temp 37.6 °C (99.7 °F) (Temporal)   Resp 16   Ht 1.778 m (5' 10\")   Wt 83.2 kg (183 lb 6.4 oz)   SpO2 96%   BMI 26.32 kg/m²   Physical Exam  Vitals signs reviewed.   Constitutional:       General: He is not in acute distress.     Appearance: He is well-developed. He is not ill-appearing or toxic-appearing.   HENT:      Head: Normocephalic and " atraumatic.      Jaw: There is normal jaw occlusion.      Right Ear: External ear normal.      Left Ear: External ear normal.      Nose: Nose normal.      Mouth/Throat:      Mouth: Mucous membranes are moist.   Eyes:      General: Lids are normal.      Extraocular Movements: Extraocular movements intact.      Conjunctiva/sclera: Conjunctivae normal.      Pupils: Pupils are equal, round, and reactive to light.   Neck:      Musculoskeletal: Normal range of motion and neck supple.   Cardiovascular:      Rate and Rhythm: Normal rate and regular rhythm.      Heart sounds: Normal heart sounds.   Pulmonary:      Effort: Pulmonary effort is normal. No respiratory distress.      Breath sounds: Normal breath sounds.   Musculoskeletal:      Lumbar back: He exhibits decreased range of motion, tenderness, swelling and pain. He exhibits no bony tenderness and no spasm.        Back:    Skin:     General: Skin is warm and dry.      Findings: No rash.   Neurological:      Mental Status: He is alert and oriented to person, place, and time.      Cranial Nerves: No cranial nerve deficit.      Sensory: No sensory deficit.      Motor: Motor function is intact.      Coordination: Coordination is intact.   Psychiatric:         Attention and Perception: Attention normal.         Mood and Affect: Mood and affect normal.         Speech: Speech normal.         Behavior: Behavior normal.         Thought Content: Thought content normal.         Cognition and Memory: Cognition normal.         Judgment: Judgment normal.       Lumbar spine: No spinous process tenderness, step-off or deformity noted.  Mild tenderness right lumbar paravertebral muscles without spasm noted.  Negative straight leg raise.  Patient does exhibit some pain with rotation to the right as well as forward bend.  Motor strength bilateral lower extremities 5/5.  Sensation grossly intact bilateral lower extremities.  DTRs 2+ equal bilateral lower extremities.   Assessment/Plan:      1. Strain of lumbar region, subsequent encounter   The patient has not been working and I am hesitant to release him to full duty without a trial of increased activity.  Therefore, we will lift work restrictions as noted above and see the patient back on 6/20/20 for reevaluation with consideration for release at [facility] if continued improvement.  Patient can continue to utilize over-the-counter ibuprofen as needed for pain not to exceed recommended daily dose, moist heat, gentle stretching and walking.  [place] D-39 completed.     Differential diagnosis, natural history, supportive care, and indications for immediate follow-up discussed.     Red flag warning symptoms and strict ER/follow-up precautions given.  The patient demonstrated a good understanding and agreed with the treatment plan.  Please note that this note was created using voice recognition speech to text software. Every effort has been made to correct obvious errors.  However, I expect there are errors of grammar and possibly context that were not discovered prior to finalizing the note  TIFFANIE Guillen PA-C

## 2020-06-20 ENCOUNTER — OCCUPATIONAL MEDICINE (OUTPATIENT)
Dept: URGENT CARE | Facility: PHYSICIAN GROUP | Age: 30
End: 2020-06-20
Payer: COMMERCIAL

## 2020-06-20 VITALS
HEIGHT: 70 IN | BODY MASS INDEX: 26.2 KG/M2 | OXYGEN SATURATION: 96 % | DIASTOLIC BLOOD PRESSURE: 68 MMHG | SYSTOLIC BLOOD PRESSURE: 116 MMHG | TEMPERATURE: 99.3 F | WEIGHT: 183 LBS | HEART RATE: 109 BPM | RESPIRATION RATE: 16 BRPM

## 2020-06-20 DIAGNOSIS — S39.012D STRAIN OF LUMBAR REGION, SUBSEQUENT ENCOUNTER: ICD-10-CM

## 2020-06-20 PROCEDURE — 99213 OFFICE O/P EST LOW 20 MIN: CPT | Performed by: NURSE PRACTITIONER

## 2020-06-20 ASSESSMENT — ENCOUNTER SYMPTOMS
BACK PAIN: 1
NEUROLOGICAL NEGATIVE: 1
GASTROINTESTINAL NEGATIVE: 1
SENSORY CHANGE: 0
WEAKNESS: 0
TINGLING: 0

## 2020-06-20 ASSESSMENT — VISUAL ACUITY: OU: 1

## 2020-06-20 NOTE — PROGRESS NOTES
"Subjective:     Maximino Walters is a 29 y.o. male who presents for Back Pain ([facility] FUV, lower back pain, pt states he is feeling better, slight pain to bend over )    Initial UC visit on 6/7/2020 copied for continuity of care:     \"DOI: 6/3/20. Patient presents to urgent care reporting right lower back pain after he was crouching and reaching forwards for an object, he felt a sudden, sharp pain in right low back. He didn't fall the to ground, no trauma to the area. He reports sharp pain and spasms with certain movements since the incident. Pain has been gradually worsening in severity.  No history of prior back injuries. He denies bowel/bladder incontinence, radiating pain down the legs, urinary symptoms, or distal numbness/tingling. He has been taking OTC nsaids with good relief.\"    Follow-up UC visit today on 6/20/2020:    Patient reporting improvement in his symptoms.  Has not been back to work due to the restrictions.  Has been using over-the-counter pain relievers as needed.  Has full range of motion.  Reports some tightness in his right lower back when bending forward.  Otherwise, denies weakness, sensory changes, or other/new symptoms.    PMH: No pertinent past medical history to this problem  MEDS: Medications were reviewed in Epic  ALLERGIES: Allergies were reviewed in Epic  SOCHX: Works as a / at [employer]   FH: No pertinent family history to this problem    Review of Systems   Gastrointestinal: Negative.    Genitourinary: Negative.    Musculoskeletal: Positive for back pain.   Neurological: Negative.  Negative for tingling, sensory change and weakness.   All other systems reviewed and are negative.    Additional details per HPI.      Objective:     /68 (BP Location: Right arm, Patient Position: Sitting, BP Cuff Size: Adult)   Pulse (!) 109   Temp 37.4 °C (99.3 °F) (Temporal)   Resp 16   Ht 1.778 m (5' 10\")   Wt 83 kg (183 lb)   SpO2 96%   BMI 26.26 kg/m²     Physical Exam  Vitals " signs reviewed.   Constitutional:       General: He is not in acute distress.     Appearance: He is well-developed. He is not ill-appearing or toxic-appearing.   HENT:      Head: Normocephalic.      Right Ear: External ear normal.      Left Ear: External ear normal.      Nose: Nose normal.   Eyes:      General: Vision grossly intact.      Extraocular Movements: Extraocular movements intact.      Conjunctiva/sclera: Conjunctivae normal.   Neck:      Musculoskeletal: Normal range of motion.   Cardiovascular:      Rate and Rhythm: Normal rate.   Pulmonary:      Effort: Pulmonary effort is normal. No respiratory distress.   Musculoskeletal:         General: No deformity.      Lumbar back: He exhibits decreased range of motion (Minimal with bending due to pain). He exhibits no tenderness, no swelling and no deformity.   Skin:     General: Skin is warm and dry.      Coloration: Skin is not pale.      Findings: No bruising or erythema.   Neurological:      Mental Status: He is alert and oriented to person, place, and time.      Sensory: No sensory deficit.      Motor: No weakness.      Coordination: Coordination normal.      Gait: Gait normal.   Psychiatric:         Behavior: Behavior normal. Behavior is cooperative.       Assessment/Plan:     1. Strain of lumbar region, subsequent encounter      Condition improving.  Patient feels comfortable with a trial of full duty.  May use gentle massage, stretching, and range of motion exercises as tolerated. May apply heat up to 20 minutes at a time. May use over-the-counter acetaminophen alternating   with ibuprofen, per 's instructions, for additional pain relief.  Follow up in 4 days.  Return sooner if symptoms change or worsen.    Differential diagnosis, natural history, supportive care, over-the-counter symptom management per 's instructions, close monitoring, and indications for immediate follow-up discussed.     Patient advised to: Return in about 4  days (around 6/24/2020).    All questions answered. Patient agrees with the plan of care.

## 2020-06-20 NOTE — LETTER
"   [facility] [place]  [address] [place] [address]. [address] - [place] [address]  Phone:  [phone] - Fax:  [phone]   [facility] Progress Report and Disability Certification  Date of Service: 6/20/2020   No Show:  No  Date / Time of Next Visit: 6/24/2020@6:30PM   Claim Information   Patient Name: Maximino Walters  Claim Number:     Employer: [employer]  Date of Injury: 6/3/2020     Insurer / TPA: Jluis Wallingford  ID / SSN:     Occupation: /  Diagnosis: The encounter diagnosis was Strain of lumbar region, subsequent encounter.    Medical Information   Related to Industrial Injury? Yes    Subjective Complaints:  Initial UC visit on 6/7/2020 copied for continuity of care:     \"DOI: 6/3/20. Patient presents to urgent care reporting right lower back pain after he was crouching and reaching forwards for an object, he felt a sudden, sharp pain in right low back. He didn't fall the to ground, no trauma to the area. He reports sharp pain and spasms with certain movements since the incident. Pain has been gradually worsening in severity.  No history of prior back injuries. He denies bowel/bladder incontinence, radiating pain down the legs, urinary symptoms, or distal numbness/tingling. He has been taking OTC nsaids with good relief.\"    Follow-up UC visit today on 6/20/2020:    Patient reporting improvement in his symptoms.  Has not been back to work due to the restrictions.  Has been using over-the-counter pain relievers as needed.  Has full range of motion.  Reports some tightness in his right lower back when bending forward.  Otherwise, denies weakness, sensory changes, or other/new symptoms.   Objective Findings: Constitutional:       General: He is not in acute distress.     Appearance: He is well-developed. He is not ill-appearing or toxic-appearing.   Musculoskeletal:         General: No deformity.      Lumbar back: He exhibits decreased range of motion (Minimal " with bending due to pain). He exhibits no tenderness, no swelling and no deformity.   Skin:     General: Skin is warm and dry.      Coloration: Skin is not pale.      Findings: No bruising or erythema.   Neurological:      Mental Status: He is alert and oriented to person, place, and time.      Sensory: No sensory deficit.      Motor: No weakness.      Coordination: Coordination normal.      Gait: Gait normal.    Pre-Existing Condition(s):     Assessment:   Condition Improved    Status: Additional Care Required  Permanent Disability:No    Plan:      Diagnostics:      Comments:  Condition improving.  Patient feels comfortable with a trial of full duty.  May use gentle massage, stretching, and range of motion exercises as tolerated. May apply heat up to 20 minutes at a time. May use over-the-counter acetaminophen alternating   with ibuprofen, per 's instructions, for additional pain relief.  Follow up in 4 days.  Return sooner if symptoms change or worsen.    Disability Information   Status: Released to Full Duty    From:  6/20/2020  Through: 6/24/2020 Restrictions are: Temporary   Physical Restrictions   Sitting:    Standing:    Stooping:    Bending:      Squatting:    Walking:    Climbing:    Pushing:      Pulling:    Other:    Reaching Above Shoulder (L):   Reaching Above Shoulder (R):       Reaching Below Shoulder (L):    Reaching Below Shoulder (R):      Not to exceed Weight Limits   Carrying(hrs):   Weight Limit(lb):   Lifting(hrs):   Weight  Limit(lb):     Comments:      Repetitive Actions   Hands: i.e. Fine Manipulations from Grasping:     Feet: i.e. Operating Foot Controls:     Driving / Operate Machinery:     Physician Name: MICHAEL Avilez Physician Signature: MIKEL Tate e-Signature: Dr. Trevon Middleton, Medical Director   Clinic Name / Location: [facility] [address]  [place] [address] Clinic Phone Number: Dept:  [phone]   Appointment Time: 12:05 Pm Visit Start Time: 12:13 PM   Check-In Time:  12:04 Pm Visit Discharge Time:  12:47PM   Original-Treating Physician or Chiropractor    Page 2-Insurer/TPA    Page 3-Employer    Page 4-Employee

## 2020-08-09 ENCOUNTER — OFFICE VISIT (OUTPATIENT)
Dept: URGENT CARE | Facility: CLINIC | Age: 30
End: 2020-08-09
Payer: COMMERCIAL

## 2020-08-09 ENCOUNTER — HOSPITAL ENCOUNTER (OUTPATIENT)
Facility: MEDICAL CENTER | Age: 30
End: 2020-08-09
Attending: FAMILY MEDICINE
Payer: COMMERCIAL

## 2020-08-09 VITALS
DIASTOLIC BLOOD PRESSURE: 88 MMHG | RESPIRATION RATE: 12 BRPM | OXYGEN SATURATION: 97 % | SYSTOLIC BLOOD PRESSURE: 116 MMHG | WEIGHT: 168 LBS | HEIGHT: 70 IN | TEMPERATURE: 99.7 F | HEART RATE: 98 BPM | BODY MASS INDEX: 24.05 KG/M2

## 2020-08-09 DIAGNOSIS — R19.7 DIARRHEA, UNSPECIFIED TYPE: ICD-10-CM

## 2020-08-09 DIAGNOSIS — R11.0 NAUSEA: ICD-10-CM

## 2020-08-09 DIAGNOSIS — M79.10 MYALGIA: ICD-10-CM

## 2020-08-09 PROCEDURE — U0003 INFECTIOUS AGENT DETECTION BY NUCLEIC ACID (DNA OR RNA); SEVERE ACUTE RESPIRATORY SYNDROME CORONAVIRUS 2 (SARS-COV-2) (CORONAVIRUS DISEASE [COVID-19]), AMPLIFIED PROBE TECHNIQUE, MAKING USE OF HIGH THROUGHPUT TECHNOLOGIES AS DESCRIBED BY CMS-2020-01-R: HCPCS

## 2020-08-09 PROCEDURE — 99214 OFFICE O/P EST MOD 30 MIN: CPT | Performed by: FAMILY MEDICINE

## 2020-08-09 RX ORDER — ONDANSETRON 4 MG/1
4 TABLET, ORALLY DISINTEGRATING ORAL EVERY 8 HOURS PRN
Qty: 6 TAB | Refills: 0 | Status: SHIPPED | OUTPATIENT
Start: 2020-08-09 | End: 2020-10-27

## 2020-08-09 ASSESSMENT — ENCOUNTER SYMPTOMS
MYALGIAS: 0
EYE REDNESS: 0
WEIGHT LOSS: 0
EYE DISCHARGE: 0
VOMITING: 0
NAUSEA: 0

## 2020-08-09 NOTE — PROGRESS NOTES
"Subjective:      Maximino Walters is a 29 y.o. male who presents with Diarrhea (x 1 am this morning)            Onset early this morning watery diarrhea without blood.  Multiple episodes.  No fever.  Intermittent cramping diffuse abdominal pain.  No focal abdominal pain.  +nausea. No emesis.  No recent foreign travel/camping/antibiotics.  No known food trigger.  No PMH chronic GI.  No other aggravating alleviating factors.      Review of Systems   Constitutional: Negative for malaise/fatigue and weight loss.   Eyes: Negative for discharge and redness.   Gastrointestinal: Negative for nausea and vomiting.   Musculoskeletal: Negative for joint pain and myalgias.   Skin: Negative for itching and rash.   .  Medications, Allergies, and current problem list reviewed today in Epic         Objective:     /88 (BP Location: Left arm, Patient Position: Sitting, BP Cuff Size: Adult)   Pulse 98   Temp 37.6 °C (99.7 °F) (Temporal)   Resp 12   Ht 1.778 m (5' 10\")   Wt 76.2 kg (168 lb)   SpO2 97%   BMI 24.11 kg/m²      Physical Exam  Constitutional:       General: He is not in acute distress.     Appearance: He is well-developed.   HENT:      Head: Normocephalic and atraumatic.   Eyes:      Conjunctiva/sclera: Conjunctivae normal.   Cardiovascular:      Rate and Rhythm: Normal rate and regular rhythm.      Heart sounds: Normal heart sounds. No murmur.   Pulmonary:      Effort: Pulmonary effort is normal.      Breath sounds: Normal breath sounds. No wheezing.   Abdominal:      Palpations: Abdomen is soft.      Tenderness: There is abdominal tenderness (mild diffuse).      Comments: Hyperactive bowel sounds.     Skin:     General: Skin is warm and dry.      Findings: No rash.   Neurological:      Mental Status: He is alert and oriented to person, place, and time.                 Assessment/Plan:         1. Diarrhea, unspecified type  COVID/SARS COV-2 PCR   2. Myalgia  COVID/SARS COV-2 PCR   3. Nausea  COVID/SARS COV-2 PCR    " ondansetron (ZOFRAN ODT) 4 MG TABLET DISPERSIBLE     Differential diagnosis, natural history, supportive care, and indications for immediate follow-up discussed at length.     ORT with pedialyte. Imodium prn    Self isolation per protocol. Will f/u covid 19 testing.

## 2020-08-10 LAB
COVID ORDER STATUS COVID19: NORMAL
SARS-COV-2 RNA RESP QL NAA+PROBE: NOTDETECTED
SPECIMEN SOURCE: NORMAL

## 2020-08-30 ENCOUNTER — HOSPITAL ENCOUNTER (OUTPATIENT)
Facility: MEDICAL CENTER | Age: 30
End: 2020-08-30
Attending: PHYSICIAN ASSISTANT
Payer: COMMERCIAL

## 2020-08-30 ENCOUNTER — OFFICE VISIT (OUTPATIENT)
Dept: URGENT CARE | Facility: CLINIC | Age: 30
End: 2020-08-30
Payer: COMMERCIAL

## 2020-08-30 VITALS
HEART RATE: 98 BPM | TEMPERATURE: 99.6 F | RESPIRATION RATE: 14 BRPM | DIASTOLIC BLOOD PRESSURE: 76 MMHG | SYSTOLIC BLOOD PRESSURE: 118 MMHG | OXYGEN SATURATION: 96 % | HEIGHT: 70 IN | WEIGHT: 172 LBS | BODY MASS INDEX: 24.62 KG/M2

## 2020-08-30 DIAGNOSIS — J06.9 VIRAL URI WITH COUGH: ICD-10-CM

## 2020-08-30 PROCEDURE — 99214 OFFICE O/P EST MOD 30 MIN: CPT | Performed by: PHYSICIAN ASSISTANT

## 2020-08-30 PROCEDURE — U0003 INFECTIOUS AGENT DETECTION BY NUCLEIC ACID (DNA OR RNA); SEVERE ACUTE RESPIRATORY SYNDROME CORONAVIRUS 2 (SARS-COV-2) (CORONAVIRUS DISEASE [COVID-19]), AMPLIFIED PROBE TECHNIQUE, MAKING USE OF HIGH THROUGHPUT TECHNOLOGIES AS DESCRIBED BY CMS-2020-01-R: HCPCS

## 2020-08-30 ASSESSMENT — ENCOUNTER SYMPTOMS
SPUTUM PRODUCTION: 1
RHINORRHEA: 1
FEVER: 0
SENSORY CHANGE: 0
VOMITING: 0
SWEATS: 0
SINUS PAIN: 0
COUGH: 1
HEMOPTYSIS: 0
WHEEZING: 0
FOCAL WEAKNESS: 0
ABDOMINAL PAIN: 0
SHORTNESS OF BREATH: 0
TINGLING: 0
DIARRHEA: 1
PALPITATIONS: 0
SORE THROAT: 0
MYALGIAS: 1
HEADACHES: 1
NAUSEA: 0
CHILLS: 0

## 2020-08-30 NOTE — PROGRESS NOTES
"Subjective:      Maximino Walters is a 29 y.o. male who presents with Nasal Congestion (x 1 week. Diarrhea and mild headache. )            Cough  This is a new problem. Episode onset: 1 week  The problem has been unchanged. The cough is productive of sputum. Associated symptoms include headaches, myalgias, nasal congestion and rhinorrhea. Pertinent negatives include no chest pain, chills, ear congestion, ear pain, fever, hemoptysis, postnasal drip, rash, sore throat, shortness of breath, sweats or wheezing. Nothing aggravates the symptoms. He has tried nothing for the symptoms. There is no history of asthma, bronchitis or environmental allergies.       Past Medical History:   Diagnosis Date   • Psychiatric disorder     anxiety, depression, schizophrenia, polypsychosis       Past Surgical History:   Procedure Laterality Date   • OTHER      right knee ( torn miniscus )       History reviewed. No pertinent family history.    No Known Allergies    Medications, Allergies, and current problem list reviewed today in Epic    Review of Systems   Constitutional: Positive for malaise/fatigue. Negative for chills and fever.   HENT: Positive for congestion and rhinorrhea. Negative for ear discharge, ear pain, postnasal drip, sinus pain and sore throat.    Respiratory: Positive for cough and sputum production. Negative for hemoptysis, shortness of breath and wheezing.    Cardiovascular: Negative for chest pain, palpitations and leg swelling.   Gastrointestinal: Positive for diarrhea. Negative for abdominal pain, nausea and vomiting.   Musculoskeletal: Positive for myalgias.   Skin: Negative for rash.   Neurological: Positive for headaches. Negative for tingling, sensory change and focal weakness.   Endo/Heme/Allergies: Negative for environmental allergies.     All other systems reviewed and are negative.          Objective:     /76   Pulse 98   Temp 37.6 °C (99.6 °F) (Temporal)   Resp 14   Ht 1.778 m (5' 10\")   Wt 78 kg " (172 lb)   SpO2 96%   BMI 24.68 kg/m²      Physical Exam  Constitutional:       General: He is not in acute distress.  HENT:      Head: Normocephalic and atraumatic.      Right Ear: Tympanic membrane, ear canal and external ear normal.      Left Ear: Tympanic membrane, ear canal and external ear normal.      Nose: Mucosal edema and congestion present.      Right Sinus: No maxillary sinus tenderness or frontal sinus tenderness.      Left Sinus: No maxillary sinus tenderness or frontal sinus tenderness.      Mouth/Throat:      Mouth: Mucous membranes are moist.      Pharynx: No posterior oropharyngeal erythema.   Eyes:      Conjunctiva/sclera: Conjunctivae normal.   Neck:      Musculoskeletal: Normal range of motion.   Cardiovascular:      Rate and Rhythm: Normal rate and regular rhythm.      Heart sounds: Normal heart sounds.   Pulmonary:      Effort: Pulmonary effort is normal. No respiratory distress.      Breath sounds: Normal breath sounds. No wheezing, rhonchi or rales.   Musculoskeletal: Normal range of motion.   Lymphadenopathy:      Cervical: No cervical adenopathy.   Skin:     General: Skin is warm.      Findings: No rash.   Neurological:      General: No focal deficit present.      Mental Status: He is alert and oriented to person, place, and time.   Psychiatric:         Mood and Affect: Mood normal.         Behavior: Behavior normal.         Thought Content: Thought content normal.         Judgment: Judgment normal.                 Assessment/Plan:        1. Viral URI with cough    Viral etiology and conservative measures discussed.  Encouraged Fluids, rest, humidification, Flonase, OTC cough suppressants.  COVID note given to patient.  Home isolation guidelines discussed.  - COVID/SARS COV-2 PCR; Future    Differential diagnoses, Supportive care, and indications for immediate follow-up discussed with patient.   Pathogenesis of diagnosis discussed including typical length and natural progression.    Instructed to return to clinic or nearest emergency department for any change in condition, further concerns, or worsening of symptoms.    The patient demonstrated a good understanding and agreed with the treatment plan.    Tiffany Drake P.A.-C.

## 2020-08-30 NOTE — LETTER
August 30, 2020         Patient: Maximino Walters   YOB: 1990   Date of Visit: 8/30/2020           To Whom it May Concern:    A concern for COVID-19 has been identified and testing is in progress.     We are asking you to excuse absences while they follow self-isolation protocol per CDC guidelines. They will be able to access their results through our electronic delivery system called [employer].     If the results of testing are negative then they may return to work once there has been no fever greater than 100.4 F for at least 72 hours without treatment and no vomiting or diarrhea for at least 48 hours.     If the results of testing are positive then they will be contacted by the [facility] for further instructions on duration of self-isolation and return to work protocol. In general this will also follow the CDC guidelines with a minimum of 10 days from the onset of symptoms and improving without fever, vomiting, or diarrhea as above.     In most cases repeat testing is not necessary and not offered through our urgent care.     This is the only note that will be provided from [facility] for this visit. Please schedule a visit with primary care if documents for FMLA, disability, or unemployment are required.      Sincerely,           Tiffany Drake P.A.-C.  Electronically Signed

## 2020-09-01 ENCOUNTER — TELEPHONE (OUTPATIENT)
Dept: URGENT CARE | Facility: CLINIC | Age: 30
End: 2020-09-01

## 2020-10-01 ENCOUNTER — OFFICE VISIT (OUTPATIENT)
Dept: URGENT CARE | Facility: CLINIC | Age: 30
End: 2020-10-01
Payer: COMMERCIAL

## 2020-10-01 VITALS
SYSTOLIC BLOOD PRESSURE: 112 MMHG | OXYGEN SATURATION: 98 % | BODY MASS INDEX: 24.6 KG/M2 | HEART RATE: 87 BPM | TEMPERATURE: 97.8 F | WEIGHT: 171.8 LBS | DIASTOLIC BLOOD PRESSURE: 72 MMHG | RESPIRATION RATE: 14 BRPM | HEIGHT: 70 IN

## 2020-10-01 DIAGNOSIS — R11.2 NAUSEA AND VOMITING, INTRACTABILITY OF VOMITING NOT SPECIFIED, UNSPECIFIED VOMITING TYPE: ICD-10-CM

## 2020-10-01 DIAGNOSIS — R19.7 DIARRHEA, UNSPECIFIED TYPE: ICD-10-CM

## 2020-10-01 DIAGNOSIS — J02.9 SORE THROAT: ICD-10-CM

## 2020-10-01 LAB
INT CON NEG: NEGATIVE
INT CON POS: POSITIVE
S PYO AG THROAT QL: NEGATIVE

## 2020-10-01 PROCEDURE — 99214 OFFICE O/P EST MOD 30 MIN: CPT | Performed by: NURSE PRACTITIONER

## 2020-10-01 PROCEDURE — 87880 STREP A ASSAY W/OPTIC: CPT | Performed by: NURSE PRACTITIONER

## 2020-10-01 RX ORDER — DIPHENOXYLATE HYDROCHLORIDE AND ATROPINE SULFATE 2.5; .025 MG/1; MG/1
1 TABLET ORAL 3 TIMES DAILY PRN
Qty: 20 TAB | Refills: 0 | Status: SHIPPED | OUTPATIENT
Start: 2020-10-01 | End: 2020-10-08

## 2020-10-01 ASSESSMENT — ENCOUNTER SYMPTOMS
COUGH: 0
VOMITING: 1
DIZZINESS: 0
FEVER: 0
EYE REDNESS: 0
EYE PAIN: 0
NECK PAIN: 0
EYE DISCHARGE: 0
SINUS PAIN: 0
ABDOMINAL PAIN: 0
CHILLS: 0
SORE THROAT: 1
TINGLING: 0
SHORTNESS OF BREATH: 0
NAUSEA: 1
DIARRHEA: 1
ORTHOPNEA: 0
HEADACHES: 0
PALPITATIONS: 0
BACK PAIN: 0
SENSORY CHANGE: 0

## 2020-10-01 ASSESSMENT — LIFESTYLE VARIABLES: SUBSTANCE_ABUSE: 0

## 2020-10-01 NOTE — PATIENT INSTRUCTIONS
Viral Gastroenteritis, Adult    Viral gastroenteritis is also known as the stomach flu. This condition may affect your stomach, small intestine, and large intestine. It can cause sudden watery diarrhea, fever, and vomiting. This condition is caused by many different viruses. These viruses can be passed from person to person very easily (are contagious).  Diarrhea and vomiting can make you feel weak and cause you to become dehydrated. You may not be able to keep fluids down. Dehydration can make you tired and thirsty, cause you to have a dry mouth, and decrease how often you urinate. It is important to replace the fluids that you lose from diarrhea and vomiting.  What are the causes?  Gastroenteritis is caused by many viruses, including rotavirus and norovirus. Norovirus is the most common cause in adults. You can get sick after being exposed to the viruses from other people. You can also get sick by:  · Eating food, drinking water, or touching a surface contaminated with one of these viruses.  · Sharing utensils or other personal items with an infected person.  What increases the risk?  You are more likely to develop this condition if you:  · Have a weak body defense system (immune system).  · Live with one or more children who are younger than 2 years old.  · Live in a nursing home.  · Travel on cruise ships.  What are the signs or symptoms?  Symptoms of this condition start suddenly 1-3 days after exposure to a virus. Symptoms may last for a few days or for as long as a week. Common symptoms include watery diarrhea and vomiting. Other symptoms include:  · Fever.  · Headache.  · Fatigue.  · Pain in the abdomen.  · Chills.  · Weakness.  · Nausea.  · Muscle aches.  · Loss of appetite.  How is this diagnosed?  This condition is diagnosed with a medical history and physical exam. You may also have a stool test to check for viruses or other infections.  How is this treated?  This condition typically goes away on its  own. The focus of treatment is to prevent dehydration and restore lost fluids (rehydration). This condition may be treated with:  · An oral rehydration solution (ORS) to replace important salts and minerals (electrolytes) in your body. Take this if told by your health care provider. This is a drink that is sold at pharmacies and retail stores.  · Medicines to help with your symptoms.  · Probiotic supplements to reduce symptoms of diarrhea.  · Fluids given through an IV, if dehydration is severe.  Older adults and people with other diseases or a weak immune system are at higher risk for dehydration.  Follow these instructions at home:    Eating and drinking    · Take an ORS as told by your health care provider.  · Drink clear fluids in small amounts as you are able. Clear fluids include:  ? Water.  ? Ice chips.  ? Diluted fruit juice.  ? Low-calorie sports drinks.  · Drink enough fluid to keep your urine pale yellow.  · Eat small amounts of healthy foods every 3-4 hours as you are able. This may include whole grains, fruits, vegetables, lean meats, and yogurt.  · Avoid fluids that contain a lot of sugar or caffeine, such as energy drinks, sports drinks, and soda.  · Avoid spicy or fatty foods.  · Avoid alcohol.  General instructions  · Wash your hands often, especially after having diarrhea or vomiting. If soap and water are not available, use hand .  · Make sure that all people in your household wash their hands well and often.  · Take over-the-counter and prescription medicines only as told by your health care provider.  · Rest at home while you recover.  · Watch your condition for any changes.  · Take a warm bath to relieve any burning or pain from frequent diarrhea episodes.  · Keep all follow-up visits as told by your health care provider. This is important.  Contact a health care provider if you:  · Cannot keep fluids down.  · Have symptoms that get worse.  · Have new symptoms.  · Feel light-headed or  dizzy.  · Have muscle cramps.  Get help right away if you:  · Have chest pain.  · Feel extremely weak or you faint.  · See blood in your vomit.  · Have vomit that looks like coffee grounds.  · Have bloody or black stools or stools that look like tar.  · Have a severe headache, a stiff neck, or both.  · Have a rash.  · Have severe pain, cramping, or bloating in your abdomen.  · Have trouble breathing or you are breathing very quickly.  · Have a fast heartbeat.  · Have skin that feels cold and clammy.  · Feel confused.  · Have pain when you urinate.  · Have signs of dehydration, such as:  ? Dark urine, very little urine, or no urine.  ? Cracked lips.  ? Dry mouth.  ? Sunken eyes.  ? Sleepiness.  ? Weakness.  Summary  · Viral gastroenteritis is also known as the stomach flu. It can cause sudden watery diarrhea, fever, and vomiting.  · This condition can be passed from person to person very easily (is contagious).  · Take an ORS if told by your health care provider. This is a drink that is sold at pharmacies and retail stores.  · Wash your hands often, especially after having diarrhea or vomiting. If soap and water are not available, use hand .  This information is not intended to replace advice given to you by your health care provider. Make sure you discuss any questions you have with your health care provider.  Document Released: 12/18/2006 Document Revised: 10/23/2019 Document Reviewed: 10/23/2019  [email or web address] Patient Education © 2020 [employer]

## 2020-10-01 NOTE — LETTER
[facility]  [address] [id number]     October 1, 2020    Patient: Maximino Walters   YOB: 1990   Date of Visit: 10/1/2020       To Whom It May Concern:    Maximino Waltres was seen and treated in our department on 10/1/2020.     Sincerely,     BERE PATEL

## 2020-10-01 NOTE — PROGRESS NOTES
Subjective:      Maximino Walters is a 29 y.o. male who presents with Nausea/Vomiting/Diarrhea (x 2 weeks.  He states he has mild sore throat and S.O.B..  Pt. tested Negative for Covid 19. Pt. just finished his antibiotic. Negative for fever or chills. )        Reviewed past medical, surgical and family history. Reviewed prescription and OTC medications with patient in electronic health record today  Allergies: Patient has no known allergies.            HPI there is a new problem.  Maximino is a 29-year-old male patient presents with nausea, vomiting ( 2 times in the past week)  and diarrhea for 2 weeks.  He reports that he also has developed a mild sore throat over the past week.  He was tested for COVID-19 but tested negative.  He had been started on amoxicillin.  He completed all of his amoxicillin.  He thought that he would be feeling better.  The diarrhea has gotten slightly worse since starting his antibiotics.  He is currently having 3-4 episodes of loose stools daily.  He denies abdominal pain, fever, body aches, shortness of breath, chest pain, leg swelling.  Treatments tried for his diarrhea: Zofran ODT ( which has improved his nausea but not his diarrhea. Pt did not know that this was medication for nausea).  Treatments tried for his sore throat none.  No other aggravating or alleviating factors.  His previous cough has resolved.      Review of Systems   Constitutional: Positive for malaise/fatigue. Negative for chills and fever.   HENT: Positive for sore throat. Negative for congestion, ear pain and sinus pain.    Eyes: Negative for pain, discharge and redness.   Respiratory: Negative for cough and shortness of breath.    Cardiovascular: Negative for chest pain, palpitations, orthopnea and leg swelling.   Gastrointestinal: Positive for diarrhea, nausea and vomiting. Negative for abdominal pain.   Musculoskeletal: Negative for back pain and neck pain.   Neurological: Negative for dizziness, tingling, sensory  "change and headaches.   Endo/Heme/Allergies: Negative for environmental allergies.   Psychiatric/Behavioral: Negative for substance abuse.          Objective:     /72   Pulse 87   Temp 36.6 °C (97.8 °F) (Temporal)   Resp 14   Ht 1.778 m (5' 10\")   Wt 77.9 kg (171 lb 12.8 oz)   SpO2 98%   BMI 24.65 kg/m²      Physical Exam  Vitals signs and nursing note reviewed.   Constitutional:       General: He is not in acute distress.     Appearance: Normal appearance. He is well-developed and normal weight. He is not ill-appearing, toxic-appearing or diaphoretic.   HENT:      Head: Normocephalic.      Mouth/Throat:      Mouth: Mucous membranes are moist.      Pharynx: Posterior oropharyngeal erythema (mild) present.   Eyes:      Extraocular Movements: Extraocular movements intact.      Pupils: Pupils are equal, round, and reactive to light.   Neck:      Musculoskeletal: Normal range of motion.   Cardiovascular:      Rate and Rhythm: Normal rate and regular rhythm.      Pulses: Normal pulses.      Heart sounds: Normal heart sounds.   Pulmonary:      Effort: Pulmonary effort is normal.      Breath sounds: Normal breath sounds.   Abdominal:      General: Abdomen is flat. There is no distension.      Palpations: Abdomen is soft. There is no mass.      Tenderness: There is no abdominal tenderness. There is no right CVA tenderness, left CVA tenderness, guarding or rebound.      Hernia: No hernia is present.   Musculoskeletal: Normal range of motion.   Skin:     General: Skin is warm and dry.      Capillary Refill: Capillary refill takes less than 2 seconds.   Neurological:      Mental Status: He is alert and oriented to person, place, and time.      Deep Tendon Reflexes: Reflexes are normal and symmetric.   Psychiatric:         Mood and Affect: Mood normal.         Behavior: Behavior normal. Behavior is cooperative.         Thought Content: Thought content normal.         Judgment: Judgment normal.            Strep : " negative      Assessment/Plan:     1. Sore throat  POCT Rapid Strep A   2. Diarrhea, unspecified type  diphenoxylate-atropine (LOMOTIL) 2.5-0.025 MG Tab   3. Nausea and vomiting, intractability of vomiting not specified, unspecified vomiting type         Discussed that this illness was viral in nature. Did not see any evidence of a bacterial process. OTC medications can be used for symptomatic relief of symptoms. Follow manufacturers guidelines for dosing and instructions.     Educated in proper administration of medication(s) ordered today including safety, possible SE, risks, benefits, rationale and alternatives to therapy. - Discussed pt in step down medication with OTC anti-diarrhea medications.     Consider probiotics.     Keep well hydrated    Return to urgent care clinic or PCP 7-10  days if current symptoms are not resolving in a satisfactory manner or sooner if new or worsening symptoms occur. He may need to have stool cultures if diarrhea persists.     Differential diagnosis, natural history, supportive care, and indications for immediate follow-up. Advised of signs and symptoms which would warrant further evaluation and /or emergent evaluation in ER.      Verbalized agreement with this treatment plan and seemed to understand without barriers. Questions were encouraged and answered to satisfaction.

## 2020-10-02 ENCOUNTER — TELEPHONE (OUTPATIENT)
Dept: URGENT CARE | Facility: CLINIC | Age: 30
End: 2020-10-02

## 2020-10-02 NOTE — TELEPHONE ENCOUNTER
wants a drs note from 09/14 - 09/30 - already was told yesterday we can go back to excuse the days, called today and still wants a note and asked for the same days, I explained again we cannot go back dates. He wanted a drs appt so I made him one.

## 2020-10-27 ENCOUNTER — HOSPITAL ENCOUNTER (OUTPATIENT)
Facility: MEDICAL CENTER | Age: 30
End: 2020-10-27
Attending: PHYSICIAN ASSISTANT
Payer: COMMERCIAL

## 2020-10-27 ENCOUNTER — OFFICE VISIT (OUTPATIENT)
Dept: URGENT CARE | Facility: CLINIC | Age: 30
End: 2020-10-27
Payer: COMMERCIAL

## 2020-10-27 VITALS
DIASTOLIC BLOOD PRESSURE: 80 MMHG | WEIGHT: 174 LBS | HEIGHT: 70 IN | HEART RATE: 84 BPM | TEMPERATURE: 98.1 F | SYSTOLIC BLOOD PRESSURE: 116 MMHG | BODY MASS INDEX: 24.91 KG/M2 | OXYGEN SATURATION: 99 % | RESPIRATION RATE: 16 BRPM

## 2020-10-27 DIAGNOSIS — L02.412 ABSCESS OF LEFT AXILLA: ICD-10-CM

## 2020-10-27 PROCEDURE — 87077 CULTURE AEROBIC IDENTIFY: CPT

## 2020-10-27 PROCEDURE — 87186 SC STD MICRODIL/AGAR DIL: CPT

## 2020-10-27 PROCEDURE — 10060 I&D ABSCESS SIMPLE/SINGLE: CPT | Performed by: PHYSICIAN ASSISTANT

## 2020-10-27 PROCEDURE — 87070 CULTURE OTHR SPECIMN AEROBIC: CPT

## 2020-10-27 PROCEDURE — 87205 SMEAR GRAM STAIN: CPT

## 2020-10-27 RX ORDER — SULFAMETHOXAZOLE AND TRIMETHOPRIM 800; 160 MG/1; MG/1
1 TABLET ORAL 2 TIMES DAILY
Qty: 14 TAB | Refills: 0 | Status: SHIPPED | OUTPATIENT
Start: 2020-10-27 | End: 2020-11-03

## 2020-10-27 ASSESSMENT — ENCOUNTER SYMPTOMS
FEVER: 0
CHILLS: 0

## 2020-10-27 NOTE — LETTER
October 27, 2020         Patient: Maximino Walters   YOB: 1990   Date of Visit: 10/27/2020           To Whom it May Concern:    Maximino Walters was seen in my clinic on 10/27/2020.  Please excuse his absence today.  If you have any questions or concerns, please don't hesitate to call.        Sincerely,           Aron Lindsey P.A.-C.  Electronically Signed

## 2020-10-28 DIAGNOSIS — L02.412 ABSCESS OF LEFT AXILLA: ICD-10-CM

## 2020-10-28 LAB
GRAM STN SPEC: NORMAL
SIGNIFICANT IND 70042: NORMAL
SITE SITE: NORMAL
SOURCE SOURCE: NORMAL

## 2020-10-28 NOTE — PROCEDURES
I and D    Date/Time: 10/27/2020 6:57 PM  Performed by: Aron Lindsey P.A.-C.  Authorized by: Aron Lindsey P.A.-C.   Type: abscess  Body area: upper extremity  Location details: left arm  Anesthesia: local infiltration    Anesthesia:  Local Anesthetic: lidocaine 2% without epinephrine  Anesthetic total: 3 mL  Scalpel size: 11  Incision type: single straight  Incision depth: dermal  Complexity: simple  Drainage: purulent  Drainage amount: copious  Wound treatment: wound left open  Patient tolerance: patient tolerated the procedure well with no immediate complications      Area was cleaned with Betadine prior to procedure.  Area was again cleaned and irrigation with saline to the wound site.  No further pus was able to be drained from the wound site.  Antibiotic ointment, nonstick gauze and paper tape used for bandage.

## 2020-10-28 NOTE — PROGRESS NOTES
"  Subjective:   Maximino Walters is a 29 y.o. male who presents today with   Chief Complaint   Patient presents with   • Abscess     left under arm, pain to touch, hurt to raise arm couple days       Other  This is a new problem. Episode onset: 3 days. The problem occurs constantly. The problem has been unchanged. Pertinent negatives include no chills or fever. Exacerbated by: movement. He has tried nothing for the symptoms. The treatment provided no relief.     PMH:  has a past medical history of Psychiatric disorder.  MEDS:   Current Outpatient Medications:   •  sulfamethoxazole-trimethoprim (BACTRIM DS) 800-160 MG tablet, Take 1 Tab by mouth 2 times a day for 7 days., Disp: 14 Tab, Rfl: 0  •  aripiprazole (ABILIFY) 9.75 MG/1.3ML injection, by Intramuscular route every 30 days., Disp: , Rfl:   ALLERGIES: No Known Allergies  SURGHX:   Past Surgical History:   Procedure Laterality Date   • OTHER      right knee ( torn miniscus )     SOCHX:  reports that he has quit smoking. He has never used smokeless tobacco. He reports previous alcohol use. He reports that he does not use drugs.  FH: Reviewed with patient, not pertinent to this visit.       Review of Systems   Constitutional: Negative for chills and fever.   Skin:        Abscess under left arm.   All other systems reviewed and are negative.       Objective:   /80 (BP Location: Left arm, Patient Position: Sitting, BP Cuff Size: Adult)   Pulse 84   Temp 36.7 °C (98.1 °F) (Temporal)   Resp 16   Ht 1.778 m (5' 10\")   Wt 78.9 kg (174 lb)   SpO2 99%   BMI 24.97 kg/m²   Physical Exam  Vitals signs and nursing note reviewed.   Constitutional:       General: He is not in acute distress.     Appearance: Normal appearance. He is well-developed. He is not ill-appearing or toxic-appearing.   HENT:      Head: Normocephalic and atraumatic.      Right Ear: Hearing normal.      Left Ear: Hearing normal.   Eyes:      Pupils: Pupils are equal, round, and reactive to light.  "   Cardiovascular:      Rate and Rhythm: Normal rate and regular rhythm.      Heart sounds: Normal heart sounds.   Pulmonary:      Effort: Pulmonary effort is normal.   Musculoskeletal:      Comments: Tenderness to palpation to the left axilla.  Limited range of motion secondary to pain.   Lymphadenopathy:      Upper Body:      Left upper body: Axillary adenopathy present.   Skin:     General: Skin is warm and dry.             Comments: 2 cm abscess indurated and fluctuant and the left axillary area, surrounding erythema.  No streaking or erythema proximal or distally.   Neurological:      Mental Status: He is alert.      Coordination: Coordination normal.   Psychiatric:         Mood and Affect: Mood normal.           Assessment/Plan:   Assessment    1. Abscess of left axilla  - CULTURE WOUND W/ GRAM STAIN; Future  - sulfamethoxazole-trimethoprim (BACTRIM DS) 800-160 MG tablet; Take 1 Tab by mouth 2 times a day for 7 days.  Dispense: 14 Tab; Refill: 0  Patient tolerated procedure well today.  We will follow-up with wound culture.  Discussed wound care instructions with him.  Area was left open and encouraged him to keep the area clean and covered.  Discussed red flag signs and encouraged him to come back in in a couple of days for wound recheck with any new concerns.  Encouraged him to continue using warm compress to the area but would likely benefit from using ice on the area tonight and continue using ibuprofen for swelling and inflammation.  Differential diagnosis, natural history, supportive care, and indications for immediate follow-up discussed.   Patient given instructions and understanding of medications and treatment.    If not improving in 3-5 days, F/U with PCP or return to  if symptoms worsen.    Patient agreeable to plan.      Please note that this dictation was created using voice recognition software. I have made every reasonable attempt to correct obvious errors, but I expect that there are errors  of grammar and possibly content that I did not discover before finalizing the note.    Aron Lindsey PA-C

## 2020-10-30 LAB
BACTERIA WND AEROBE CULT: ABNORMAL
BACTERIA WND AEROBE CULT: ABNORMAL
GRAM STN SPEC: ABNORMAL
SIGNIFICANT IND 70042: ABNORMAL
SITE SITE: ABNORMAL
SOURCE SOURCE: ABNORMAL

## 2020-11-04 ENCOUNTER — TELEPHONE (OUTPATIENT)
Dept: URGENT CARE | Facility: CLINIC | Age: 30
End: 2020-11-04

## 2020-11-05 NOTE — TELEPHONE ENCOUNTER
Pt called asking about the results from their culture wound gram stain; notified patient it was positive and they should continue their antibiotics until finished.

## 2021-01-20 ENCOUNTER — HOSPITAL ENCOUNTER (OUTPATIENT)
Facility: MEDICAL CENTER | Age: 31
End: 2021-01-20
Attending: PHYSICIAN ASSISTANT
Payer: COMMERCIAL

## 2021-01-20 ENCOUNTER — OFFICE VISIT (OUTPATIENT)
Dept: URGENT CARE | Facility: CLINIC | Age: 31
End: 2021-01-20

## 2021-01-20 VITALS
BODY MASS INDEX: 24.5 KG/M2 | HEIGHT: 71 IN | TEMPERATURE: 98.8 F | HEART RATE: 88 BPM | OXYGEN SATURATION: 98 % | SYSTOLIC BLOOD PRESSURE: 122 MMHG | DIASTOLIC BLOOD PRESSURE: 68 MMHG | RESPIRATION RATE: 12 BRPM | WEIGHT: 175 LBS

## 2021-01-20 DIAGNOSIS — R11.0 NAUSEA: ICD-10-CM

## 2021-01-20 DIAGNOSIS — Z20.822 SUSPECTED COVID-19 VIRUS INFECTION: ICD-10-CM

## 2021-01-20 PROCEDURE — 99214 OFFICE O/P EST MOD 30 MIN: CPT | Mod: CS | Performed by: PHYSICIAN ASSISTANT

## 2021-01-20 PROCEDURE — U0005 INFEC AGEN DETEC AMPLI PROBE: HCPCS

## 2021-01-20 PROCEDURE — U0003 INFECTIOUS AGENT DETECTION BY NUCLEIC ACID (DNA OR RNA); SEVERE ACUTE RESPIRATORY SYNDROME CORONAVIRUS 2 (SARS-COV-2) (CORONAVIRUS DISEASE [COVID-19]), AMPLIFIED PROBE TECHNIQUE, MAKING USE OF HIGH THROUGHPUT TECHNOLOGIES AS DESCRIBED BY CMS-2020-01-R: HCPCS

## 2021-01-20 RX ORDER — ONDANSETRON 4 MG/1
4 TABLET, FILM COATED ORAL EVERY 4 HOURS PRN
Qty: 20 TAB | Refills: 0 | Status: SHIPPED | OUTPATIENT
Start: 2021-01-20 | End: 2021-01-25

## 2021-01-20 ASSESSMENT — ENCOUNTER SYMPTOMS
RHINORRHEA: 1
DIARRHEA: 0
ABDOMINAL PAIN: 0
NAUSEA: 1
VOMITING: 1
SWOLLEN GLANDS: 0
SINUS PAIN: 0
HEADACHES: 1
SORE THROAT: 0
NECK PAIN: 0
COUGH: 0

## 2021-01-20 NOTE — LETTER
January 20, 2021         Patient: Maximino Walters   YOB: 1990   Date of Visit: 1/20/2021           To Whom it May Concern:    Maximino Walters was seen in my clinic on 1/20/2021. He may return to work 1/21/21.    If you have any questions or concerns, please don't hesitate to call.        Sincerely,           Mukesh Patterson P.A.-C.  Electronically Signed

## 2021-01-21 DIAGNOSIS — Z20.822 SUSPECTED COVID-19 VIRUS INFECTION: ICD-10-CM

## 2021-01-21 NOTE — PROGRESS NOTES
Subjective:      Maximino Walters is a 30 y.o. male who presents with Runny Nose (nausea and diarrhea)            Cough, congestion, nausea, vomiting, diarrhea since last night.  Needs negative test to return to work.  Denies shortness of breath, chest pain, leg swelling, fever.  No known exposure to Covid.  No history of asthma or pneumonia.    URI   This is a new problem. The current episode started yesterday. The problem has been unchanged. There has been no fever. The fever has been present for less than 1 day. Associated symptoms include congestion, headaches, nausea, rhinorrhea and vomiting. Pertinent negatives include no abdominal pain, chest pain, coughing, diarrhea, dysuria, ear pain, neck pain, sinus pain, sneezing, sore throat or swollen glands. Treatments tried: immodium. The treatment provided mild relief.       PMH:  has a past medical history of Psychiatric disorder.  MEDS:   Current Outpatient Medications:   •  ondansetron (ZOFRAN) 4 MG Tab tablet, Take 1 Tab by mouth every four hours as needed for Nausea/Vomiting for up to 5 days., Disp: 20 Tab, Rfl: 0  •  aripiprazole (ABILIFY) 9.75 MG/1.3ML injection, by Intramuscular route every 30 days., Disp: , Rfl:   ALLERGIES: No Known Allergies  SURGHX:   Past Surgical History:   Procedure Laterality Date   • OTHER      right knee ( torn miniscus )     SOCHX:  reports that he has quit smoking. He has never used smokeless tobacco. He reports previous alcohol use. He reports that he does not use drugs.  FH: family history is not on file.    Review of Systems   HENT: Positive for congestion and rhinorrhea. Negative for ear pain, sinus pain, sneezing and sore throat.    Respiratory: Negative for cough.    Cardiovascular: Negative for chest pain.   Gastrointestinal: Positive for nausea and vomiting. Negative for abdominal pain and diarrhea.   Genitourinary: Negative for dysuria.   Musculoskeletal: Negative for neck pain.   Neurological: Positive for headaches.  "      Medications, Allergies, and current problem list reviewed today in Epic     Objective:     /68   Pulse 88   Temp 37.1 °C (98.8 °F) (Temporal)   Resp 12   Ht 1.803 m (5' 11\")   Wt 79.4 kg (175 lb)   SpO2 98%   BMI 24.41 kg/m²      Physical Exam  Vitals signs and nursing note reviewed.   Constitutional:       General: He is not in acute distress.     Appearance: Normal appearance. He is well-developed. He is not ill-appearing, toxic-appearing or diaphoretic.   HENT:      Head: Normocephalic and atraumatic.      Right Ear: Tympanic membrane, ear canal and external ear normal.      Left Ear: Tympanic membrane, ear canal and external ear normal.      Nose: Nose normal. No congestion or rhinorrhea.      Mouth/Throat:      Mouth: Mucous membranes are moist.      Pharynx: Oropharynx is clear. No oropharyngeal exudate or posterior oropharyngeal erythema.   Eyes:      General:         Right eye: No discharge.         Left eye: No discharge.      Conjunctiva/sclera: Conjunctivae normal.   Neck:      Musculoskeletal: Normal range of motion and neck supple.   Cardiovascular:      Rate and Rhythm: Normal rate and regular rhythm.      Pulses: Normal pulses.      Heart sounds: Normal heart sounds. No murmur.   Pulmonary:      Effort: Pulmonary effort is normal. No respiratory distress.      Breath sounds: Normal breath sounds. No wheezing, rhonchi or rales.   Chest:      Chest wall: No tenderness.   Abdominal:      General: Abdomen is flat. There is no distension.      Tenderness: There is no abdominal tenderness. There is no right CVA tenderness, left CVA tenderness, guarding or rebound.   Musculoskeletal:         General: No swelling or tenderness.      Right lower leg: No edema.      Left lower leg: No edema.   Lymphadenopathy:      Cervical: No cervical adenopathy.   Skin:     General: Skin is warm and dry.   Neurological:      Mental Status: He is alert and oriented to person, place, and time.   Psychiatric: "         Mood and Affect: Mood normal.         Behavior: Behavior normal.         Thought Content: Thought content normal.         Judgment: Judgment normal.                 Assessment/Plan:         1. Suspected COVID-19 virus infection  SARS-CoV-2, PCR (In-House): Collect NP OR nasal swab in VTM   2. Nausea  ondansetron (ZOFRAN) 4 MG Tab tablet     Patient denies shortness of breath, chest pain, fevers, calf swelling.  PO2 98% vital signs normal.  Lungs clear bilateral that wheezes rhonchi or rales.  Exam unremarkable.  Covid testing initiated.  Quarantine per CDC guidelines.  Note for work provided  OTC meds and conservative measures as discussed    Return to clinic or go to ED if symptoms worsen or persist. Indications for ED discussed at length. Patient/Parent/Guardian voices understanding. Follow-up with your primary care provider in 3-5 days. Red flag symptoms discussed. All side effects of medication discussed including allergic response, GI upset, tendon injury, rash, sedation etc.    Please note that this dictation was created using voice recognition software. I have made every reasonable attempt to correct obvious errors, but I expect that there are errors of grammar and possibly content that I did not discover before finalizing the note.

## 2021-07-14 ENCOUNTER — OFFICE VISIT (OUTPATIENT)
Dept: URGENT CARE | Facility: CLINIC | Age: 31
End: 2021-07-14

## 2021-07-14 VITALS
BODY MASS INDEX: 26.63 KG/M2 | SYSTOLIC BLOOD PRESSURE: 120 MMHG | DIASTOLIC BLOOD PRESSURE: 80 MMHG | RESPIRATION RATE: 16 BRPM | HEIGHT: 70 IN | WEIGHT: 186 LBS | HEART RATE: 95 BPM | OXYGEN SATURATION: 98 % | TEMPERATURE: 98.1 F

## 2021-07-14 DIAGNOSIS — J06.9 ACUTE URI: ICD-10-CM

## 2021-07-14 DIAGNOSIS — R05.9 COUGH: ICD-10-CM

## 2021-07-14 PROCEDURE — 99213 OFFICE O/P EST LOW 20 MIN: CPT | Performed by: PHYSICIAN ASSISTANT

## 2021-07-14 ASSESSMENT — ENCOUNTER SYMPTOMS: COUGH: 1

## 2021-07-14 NOTE — PROGRESS NOTES
"Subjective:      Maximino Walters is a 30 y.o. male who presents with Cough (runny nose, sneezing, phlegm, x 3. )    Medications:    • aripiprazole    Allergies: Patient has no known allergies.    Problem List: Maximino Walters does not have a problem list on file.    Surgical History:  Past Surgical History:   Procedure Laterality Date   • OTHER      right knee ( torn miniscus )       Past Social Hx: Maximino Walters  reports that he has quit smoking. He has never used smokeless tobacco. He reports previous alcohol use. He reports that he does not use drugs.     Past Family Hx:  Maximino Walters family history is not on file. Not pertinent to today's visit.       Problem list, medications, and allergies reviewed by myself today in Epic.         Patient presents with:  Cough: runny nose, sneezing, phlegm, x 3 days.   Pt has not taken any over-the-counter medications for his symptoms.Pt states he needs a note for missing work as he wasn't feeling well enough to go.        URI   This is a new problem. The current episode started in the past 7 days. The problem has been gradually improving. There has been no fever. Associated symptoms include congestion, coughing, rhinorrhea and sneezing. Pertinent negatives include no diarrhea, dysuria, plugged ear sensation, sinus pain, sore throat or wheezing. He has tried nothing for the symptoms. The treatment provided no relief.       Review of Systems   Constitutional: Negative for chills and fever.   HENT: Positive for congestion, rhinorrhea and sneezing. Negative for sinus pain and sore throat.    Respiratory: Positive for cough. Negative for sputum production and wheezing.    Gastrointestinal: Negative for diarrhea.   Genitourinary: Negative for dysuria.   Endo/Heme/Allergies: Positive for environmental allergies.   All other systems reviewed and are negative.         Objective:     /80   Pulse 95   Temp 36.7 °C (98.1 °F)   Resp 16   Ht 1.778 m (5' 10\")   Wt 84.4 kg (186 lb)   " SpO2 98%   BMI 26.69 kg/m²      Physical Exam  Vitals and nursing note reviewed.   Constitutional:       General: He is not in acute distress.     Appearance: Normal appearance. He is well-developed and normal weight. He is not ill-appearing or toxic-appearing.   HENT:      Head: Normocephalic and atraumatic.      Right Ear: Tympanic membrane normal.      Left Ear: Tympanic membrane normal.      Nose: Mucosal edema, congestion and rhinorrhea present.      Mouth/Throat:      Lips: Pink.      Mouth: Mucous membranes are moist.      Pharynx: Uvula midline. No posterior oropharyngeal erythema or uvula swelling.   Eyes:      Extraocular Movements: Extraocular movements intact.      Conjunctiva/sclera: Conjunctivae normal.      Pupils: Pupils are equal, round, and reactive to light.   Cardiovascular:      Rate and Rhythm: Normal rate and regular rhythm.      Pulses: Normal pulses.      Heart sounds: Normal heart sounds.   Pulmonary:      Effort: Pulmonary effort is normal.      Breath sounds: Normal breath sounds. No decreased breath sounds, wheezing or rhonchi.   Abdominal:      Palpations: Abdomen is soft.   Musculoskeletal:         General: Normal range of motion.      Cervical back: Normal range of motion and neck supple.   Lymphadenopathy:      Cervical: No cervical adenopathy.   Skin:     General: Skin is warm and dry.      Capillary Refill: Capillary refill takes less than 2 seconds.   Neurological:      General: No focal deficit present.      Mental Status: He is alert and oriented to person, place, and time.      Gait: Gait normal.   Psychiatric:         Mood and Affect: Mood normal.         Behavior: Behavior is cooperative.                        Assessment/Plan:          1. Acute URI     2. Cough       Patient is fully vaccinated, states he does not wish to have a Covid test completed today.    Discussed that I felt this was viral in nature. Did not see any evidence of a bacterial process. Discussed natural  progression and sx care.    I encourage patient to try some over-the-counter cough cold medications like DayQuil/NyQuil, or Mucinex but not both.  Patient verbalized understanding and agreement with this plan.    PT should follow up with PCP in 1-2 days for re-evaluation if symptoms have not improved.      Discussed red flags and reasons to return to UC or ED.      Pt and/or family verbalized understanding of diagnosis and follow up instructions and was offered informational handout on diagnosis.  PT discharged.

## 2021-07-16 ASSESSMENT — ENCOUNTER SYMPTOMS
SORE THROAT: 0
SPUTUM PRODUCTION: 0
CHILLS: 0
RHINORRHEA: 1
FEVER: 0
DIARRHEA: 0
WHEEZING: 0
SINUS PAIN: 0

## 2021-07-26 ENCOUNTER — OFFICE VISIT (OUTPATIENT)
Dept: URGENT CARE | Facility: CLINIC | Age: 31
End: 2021-07-26

## 2021-07-26 VITALS
TEMPERATURE: 97.9 F | RESPIRATION RATE: 16 BRPM | OXYGEN SATURATION: 97 % | DIASTOLIC BLOOD PRESSURE: 80 MMHG | HEIGHT: 71 IN | SYSTOLIC BLOOD PRESSURE: 120 MMHG | BODY MASS INDEX: 23.63 KG/M2 | HEART RATE: 81 BPM | WEIGHT: 168.8 LBS

## 2021-07-26 DIAGNOSIS — S39.012A STRAIN OF LUMBAR REGION, INITIAL ENCOUNTER: ICD-10-CM

## 2021-07-26 DIAGNOSIS — M62.830 LUMBAR PARASPINAL MUSCLE SPASM: ICD-10-CM

## 2021-07-26 PROCEDURE — 99213 OFFICE O/P EST LOW 20 MIN: CPT | Performed by: NURSE PRACTITIONER

## 2021-07-26 RX ORDER — CYCLOBENZAPRINE HCL 5 MG
5 TABLET ORAL 3 TIMES DAILY PRN
Qty: 20 TABLET | Refills: 0 | Status: SHIPPED | OUTPATIENT
Start: 2021-07-26 | End: 2021-08-04 | Stop reason: SDUPTHER

## 2021-07-26 NOTE — PROGRESS NOTES
Chief Complaint   Patient presents with   • Back Pain     Back spasms, muscle tightness, back pain x 4 days. The patient said he sneezed and  the back locked up.     • Other     The patient would like a work note stating any restrictions.       HISTORY OF PRESENT ILLNESS: Patient is a pleasant 30 y.o. male who presents today due to three days of left lower back pain, non radiating. Occurred immediately after he sneezed. Denies fever, chills, malaise, hematuria, saddle anesthesia, numbness or tingling, unilateral weakness, or loss of bowel or bladder. He has taken Ibuprofen and tylenol. Pain exacerbated with coughing and sneezing. He has a very active job and is concerned about returning. He overall reports improvement.       There are no problems to display for this patient.      Allergies:Patient has no known allergies.    No current [facility]-ordered outpatient medications on file.     No current [facility]-ordered facility-administered medications on file.       Past Medical History:   Diagnosis Date   • Psychiatric disorder     anxiety, depression, schizophrenia, polypsychosis       Social History     Tobacco Use   • Smoking status: Former Smoker   • Smokeless tobacco: Never Used   Vaping Use   • Vaping Use: Every day   • Substances: Nicotine, Flavoring   • Devices: Refillable tank   Substance Use Topics   • Alcohol use: Not Currently     Comment: occ   • Drug use: No       No family status information on file.   History reviewed. No pertinent family history.    ROS:  Review of Systems   Constitutional: Negative for fever, chills, weight loss, malaise, and fatigue.   HENT: Negative for ear pain, nosebleeds, congestion, sore throat and neck pain.    Eyes: Negative for vision changes.   Neuro: Negative for headache, sensory changes, weakness, seizure, LOC.   Cardiovascular: Negative for chest pain, palpitations, orthopnea and leg swelling.   Respiratory: Negative for cough, sputum production, shortness of breath and  "wheezing.   Gastrointestinal: Negative for abdominal pain, nausea, vomiting or diarrhea.   Genitourinary: Negative for dysuria, urgency and frequency.  Musculoskeletal: Positive for left lower back pain, non radiating. Negative for falls, neck pain, joint pain, myalgias.   Skin:  Negative for rash, diaphoresis.     Exam:  /80 (BP Location: Left arm, Patient Position: Sitting, BP Cuff Size: Adult)   Pulse 81   Temp 36.6 °C (97.9 °F) (Temporal)   Resp 16   Ht 1.791 m (5' 10.5\")   Wt 76.6 kg (168 lb 12.8 oz)   SpO2 97%   General: well-nourished, well-developed male in NAD  Head: normocephalic, atraumatic  Eyes: PERRLA, no conjunctival injection, acuity grossly intact, lids normal.  Ears: normal shape and symmetry, no tenderness, no discharge. External canals are without any significant edema or erythema. Tympanic membranes are without any inflammation, no effusion. Gross auditory acuity is intact.  Nose: symmetrical without tenderness, no discharge.  Mouth/Throat: reasonable hygiene, no erythema, exudates or tonsillar enlargement.  Neck: no masses, range of motion within normal limits, no tracheal deviation. No obvious thyroid enlargement.   Lymph: no cervical adenopathy. No supraclavicular adenopathy.   Neuro: alert and oriented. Cranial nerves 1-12 grossly intact. No sensory deficit.   Cardiovascular: regular rate and rhythm. No edema.  Pulmonary: no distress. Chest is symmetrical with respiration, no wheezes, crackles, or rhonchi.   Musculoskeletal: lumbar spine: pt exhibits decreased range of motion with forward flexion and tenderness to the left paraspinal lumbar region. Pt exhibits no bony tenderness, swelling, spasm, edema or deformity. Patient has normal reflexes, patellar reflexes are 2+ on the right side and 2+ on the left side. Patient exhibits normal muscle tone. Negative straight leg raise. Gait even and steady. No clubbing.   Skin: warm, dry, intact, no clubbing, no cyanosis, no rashes. "   Psych: appropriate mood, affect, judgement.         Assessment/Plan:  1. Lumbar paraspinal muscle spasm  cyclobenzaprine (FLEXERIL) 5 mg tablet   2. Strain of lumbar region, initial encounter           Gentle stretching, warm compresses, OTC NSAID, Flexeril (sedative effects addressed). Work note provided.   Supportive care, differential diagnoses, and indications for immediate follow-up discussed with patient.   Pathogenesis of diagnosis discussed including typical length and natural progression.   Instructed to return to clinic or nearest emergency department for any change in condition, further concerns, or worsening of symptoms.  Patient states understanding of the plan of care and discharge instructions.  Instructed to make an appointment, for follow up, with his primary care provider.        Please note that this dictation was created using voice recognition software. I have made every reasonable attempt to correct obvious errors, but I expect that there are errors of grammar and possibly content that I did not discover before finalizing the note.      MICHOACANO Peña.

## 2021-07-26 NOTE — LETTER
July 26, 2021         Patient: Maximino Walters   YOB: 1990   Date of Visit: 7/26/2021           To Whom it May Concern:    Maximino Walters was seen in my clinic on 7/26/2021. He may be excused from work for the next three days.     If you have any questions or concerns, please don't hesitate to call.        Sincerely,           MICHOACANO Peña.  Electronically Signed

## 2021-08-04 ENCOUNTER — OFFICE VISIT (OUTPATIENT)
Dept: URGENT CARE | Facility: CLINIC | Age: 31
End: 2021-08-04
Payer: COMMERCIAL

## 2021-08-04 VITALS
RESPIRATION RATE: 16 BRPM | OXYGEN SATURATION: 100 % | TEMPERATURE: 97.7 F | HEIGHT: 71 IN | WEIGHT: 171.6 LBS | DIASTOLIC BLOOD PRESSURE: 80 MMHG | SYSTOLIC BLOOD PRESSURE: 112 MMHG | BODY MASS INDEX: 24.02 KG/M2 | HEART RATE: 102 BPM

## 2021-08-04 DIAGNOSIS — S39.012A STRAIN OF LUMBAR REGION, INITIAL ENCOUNTER: ICD-10-CM

## 2021-08-04 DIAGNOSIS — M62.830 LUMBAR PARASPINAL MUSCLE SPASM: ICD-10-CM

## 2021-08-04 PROCEDURE — 99214 OFFICE O/P EST MOD 30 MIN: CPT | Performed by: PHYSICIAN ASSISTANT

## 2021-08-04 RX ORDER — METHYLPREDNISOLONE 4 MG/1
TABLET ORAL
Qty: 21 TABLET | Refills: 0 | Status: SHIPPED | OUTPATIENT
Start: 2021-08-04 | End: 2021-12-16

## 2021-08-04 RX ORDER — CYCLOBENZAPRINE HCL 5 MG
5 TABLET ORAL 3 TIMES DAILY PRN
Qty: 20 TABLET | Refills: 0 | Status: SHIPPED | OUTPATIENT
Start: 2021-08-04 | End: 2021-12-16

## 2021-08-04 ASSESSMENT — ENCOUNTER SYMPTOMS
PERIANAL NUMBNESS: 0
PARESIS: 0
COUGH: 0
SENSORY CHANGE: 0
BACK PAIN: 1
WEAKNESS: 0
NAUSEA: 0
FOCAL WEAKNESS: 0
NUMBNESS: 0
FEVER: 0
PARESTHESIAS: 0
BOWEL INCONTINENCE: 0
TINGLING: 0
SHORTNESS OF BREATH: 0
LEG PAIN: 0
VOMITING: 0
ABDOMINAL PAIN: 0
MYALGIAS: 1
CHILLS: 0

## 2021-08-04 NOTE — LETTER
August 4, 2021         Patient: Maximino Walters   YOB: 1990   Date of Visit: 8/4/2021           To Whom it May Concern:    Maximino Walters was seen in my clinic on 8/4/2021. He should avoid lifting more that 10 lbs, no bending, twisting, climbing or squatting for the next week.    If you have any questions or concerns, please don't hesitate to call.        Sincerely,           Tiffany Drake P.A.-C.  Electronically Signed

## 2021-08-05 NOTE — PROGRESS NOTES
Subjective:      Maximino Walters is a 30 y.o. male who presents with Back Pain (back pain, spasm, and muscle tightness, he said he sneezed and the back seized up. x 1 week)            Back Pain  This is a new problem. Episode onset: 2 weeks  The problem occurs constantly. The problem has been gradually worsening (worsened over the past 2 days ) since onset. The pain is present in the lumbar spine (left lumbar spine ). The quality of the pain is described as aching and cramping. The pain does not radiate. The pain is moderate. The symptoms are aggravated by bending, twisting and position. Pertinent negatives include no abdominal pain, bladder incontinence, bowel incontinence, chest pain, dysuria, fever, leg pain, numbness, paresis, paresthesias, perianal numbness, tingling or weakness. He has tried muscle relaxant for the symptoms. The treatment provided moderate relief.       The patient was seen 9 days ago for the same complaint. He states he was doing well up until about 2 days ago when he coughed and his back pain worsened.      Past Medical History:   Diagnosis Date   • Psychiatric disorder     anxiety, depression, schizophrenia, polypsychosis       Past Surgical History:   Procedure Laterality Date   • OTHER      right knee ( torn miniscus )       No family history on file.    No Known Allergies    Medications, Allergies, and current problem list reviewed today in Epic    Review of Systems   Constitutional: Negative for chills, fever and malaise/fatigue.   Respiratory: Negative for cough and shortness of breath.    Cardiovascular: Negative for chest pain.   Gastrointestinal: Negative for abdominal pain, bowel incontinence, nausea and vomiting.   Genitourinary: Negative for bladder incontinence and dysuria.        No urinary or bowel incontinence    Musculoskeletal: Positive for back pain and myalgias.   Neurological: Negative for tingling, sensory change, focal weakness, weakness, numbness and paresthesias.  "    All other systems reviewed and are negative.        Objective:     /80 (BP Location: Right arm, Patient Position: Sitting, BP Cuff Size: Adult)   Pulse (!) 102   Temp 36.5 °C (97.7 °F) (Temporal)   Resp 16   Ht 1.791 m (5' 10.5\")   Wt 77.8 kg (171 lb 9.6 oz)   SpO2 100%   BMI 24.27 kg/m²      Physical Exam  Constitutional:       General: He is not in acute distress.     Appearance: He is not ill-appearing.   HENT:      Head: Normocephalic and atraumatic.   Eyes:      Conjunctiva/sclera: Conjunctivae normal.   Cardiovascular:      Rate and Rhythm: Regular rhythm. Tachycardia present.   Pulmonary:      Effort: Pulmonary effort is normal. No respiratory distress.   Musculoskeletal:        Back:    Neurological:      General: No focal deficit present.      Mental Status: He is alert and oriented to person, place, and time.   Psychiatric:         Behavior: Behavior normal.         Thought Content: Thought content normal.         Judgment: Judgment normal.                        Assessment/Plan:        1. Strain of lumbar region, initial encounter    2. Lumbar paraspinal muscle spasm  - cyclobenzaprine (FLEXERIL) 5 mg tablet; Take 1 tablet by mouth 3 times a day as needed for Moderate Pain or Muscle Spasms.  Dispense: 20 tablet; Refill: 0  Sedation side effects discussed. No Driving or alcohol with medication given.    - methylPREDNISolone (MEDROL DOSEPAK) 4 MG Tablet Therapy Pack; Follow schedule on package instructions.  Dispense: 21 tablet; Refill: 0    Differential diagnoses, Supportive care, and indications for immediate follow-up discussed with patient.   Pathogenesis of diagnosis discussed including typical length and natural progression.   Instructed to return to clinic or nearest emergency department for any change in condition, further concerns, or worsening of symptoms.    The patient demonstrated a good understanding and agreed with the treatment plan.    Tiffany Drake P.A.-C.      "

## 2021-08-11 ENCOUNTER — OFFICE VISIT (OUTPATIENT)
Dept: URGENT CARE | Facility: CLINIC | Age: 31
End: 2021-08-11
Payer: COMMERCIAL

## 2021-08-11 VITALS
RESPIRATION RATE: 20 BRPM | DIASTOLIC BLOOD PRESSURE: 70 MMHG | TEMPERATURE: 97.7 F | HEIGHT: 71 IN | BODY MASS INDEX: 24.36 KG/M2 | OXYGEN SATURATION: 94 % | HEART RATE: 117 BPM | SYSTOLIC BLOOD PRESSURE: 110 MMHG | WEIGHT: 174 LBS

## 2021-08-11 DIAGNOSIS — M62.830 BACK SPASM: ICD-10-CM

## 2021-08-11 PROCEDURE — 99213 OFFICE O/P EST LOW 20 MIN: CPT | Performed by: FAMILY MEDICINE

## 2021-08-11 NOTE — PROGRESS NOTES
"Chief Complaint   Patient presents with   • Low Back Pain     Previously had muscle spasms, employer needs a release to allow him to return to work.           HPI:      Here for f/u on back pain.  States pain has resolved.    No longer taking medications.   Just requesting release for full duty       Pertinent negatives include no bladder incontinence, bowel incontinence, dysuria, fever, headaches, numbness or weakness     Social History     Tobacco Use   • Smoking status: Former Smoker   • Smokeless tobacco: Never Used   Vaping Use   • Vaping Use: Every day   • Substances: Nicotine, Flavoring   • Devices: Refillable tank   Substance Use Topics   • Alcohol use: Not Currently     Comment: occ   • Drug use: No       Family hx was reviewed - no pertinent past family hx      Past medical history was unremarkable and not pertinent to current issue    Review of Systems   Constitutional: Negative for fever.   Gastrointestinal: Negative for bowel incontinence.   Genitourinary: Negative for bladder incontinence, dysuria, urgency and frequency.      Neurological: Negative for weakness, numbness and headaches.   All other systems reviewed and are negative.         Objective:     /70 (BP Location: Left arm, Patient Position: Sitting, BP Cuff Size: Adult)   Pulse (!) 117   Temp 36.5 °C (97.7 °F) (Temporal)   Resp 20   Ht 1.791 m (5' 10.5\")   Wt 78.9 kg (174 lb)   SpO2 94%     Physical Exam   Constitutional: pt is oriented to person, place, and time. Pt appears well-developed and well-nourished. No distress.   HENT:   Head: Normocephalic and atraumatic.   Eyes: EOM are normal. Pupils are equal, round, and reactive to light. No scleral icterus.   Neck: Normal range of motion. Neck supple. No thyromegaly present.   Cardiovascular: Normal rate, regular rhythm and normal heart sounds.    Pulmonary/Chest: Effort normal and breath sounds normal. No respiratory distress.  no wheezes.   no rales.  no tenderness. "   Musculoskeletal: pt exhibits no edema.                   Lumbar back: pt exhibits full AROM.   No TTP   Pt exhibits no bony tenderness, no swelling, no edema, no deformity  .   Neurological: patient is alert and oriented to person, place, and time. Patient has normal reflexes. No cranial nerve deficit. Patient exhibits normal muscle tone.      Skin: Skin is warm and dry. No rash noted. No erythema.   Psychiatric: patient has a normal mood and affect.  behavior is normal.   Nursing note and vitals reviewed.              Assessment/Plan:      1. Back spasm  Resolved   Cleared for full duty

## 2021-08-17 ENCOUNTER — OFFICE VISIT (OUTPATIENT)
Dept: URGENT CARE | Facility: CLINIC | Age: 31
End: 2021-08-17
Payer: COMMERCIAL

## 2021-08-17 VITALS
HEART RATE: 119 BPM | WEIGHT: 173 LBS | TEMPERATURE: 97.9 F | RESPIRATION RATE: 16 BRPM | HEIGHT: 71 IN | BODY MASS INDEX: 24.22 KG/M2 | DIASTOLIC BLOOD PRESSURE: 84 MMHG | OXYGEN SATURATION: 99 % | SYSTOLIC BLOOD PRESSURE: 126 MMHG

## 2021-08-17 DIAGNOSIS — R10.9 RIGHT FLANK PAIN: ICD-10-CM

## 2021-08-17 DIAGNOSIS — M62.830 LUMBAR PARASPINAL MUSCLE SPASM: ICD-10-CM

## 2021-08-17 LAB
APPEARANCE UR: CLEAR
BILIRUB UR STRIP-MCNC: NEGATIVE MG/DL
COLOR UR AUTO: YELLOW
GLUCOSE UR STRIP.AUTO-MCNC: NEGATIVE MG/DL
KETONES UR STRIP.AUTO-MCNC: NEGATIVE MG/DL
LEUKOCYTE ESTERASE UR QL STRIP.AUTO: NEGATIVE
NITRITE UR QL STRIP.AUTO: NEGATIVE
PH UR STRIP.AUTO: 5 [PH] (ref 5–8)
PROT UR QL STRIP: NEGATIVE MG/DL
RBC UR QL AUTO: NEGATIVE
SP GR UR STRIP.AUTO: 1.02
UROBILINOGEN UR STRIP-MCNC: 0.2 MG/DL

## 2021-08-17 PROCEDURE — 81002 URINALYSIS NONAUTO W/O SCOPE: CPT | Performed by: NURSE PRACTITIONER

## 2021-08-17 PROCEDURE — 99213 OFFICE O/P EST LOW 20 MIN: CPT | Performed by: NURSE PRACTITIONER

## 2021-08-17 RX ORDER — IBUPROFEN 200 MG
200 TABLET ORAL EVERY 6 HOURS PRN
COMMUNITY

## 2021-08-17 RX ORDER — CYCLOBENZAPRINE HCL 5 MG
5-10 TABLET ORAL 3 TIMES DAILY PRN
Qty: 20 TABLET | Refills: 0 | Status: SHIPPED | OUTPATIENT
Start: 2021-08-17 | End: 2023-01-16

## 2021-08-17 ASSESSMENT — ENCOUNTER SYMPTOMS: BACK PAIN: 1

## 2021-08-17 NOTE — LETTER
August 17, 2021    To Whom It May Concern:         This is confirmation that Maximino Walters attended his scheduled appointment with MICHEAL Edwards on 8/17/21.    Please excuse him from work today.    Sincerely,          MICHOACANO Edwards.  [phone]

## 2021-08-17 NOTE — PROGRESS NOTES
Subjective     Maximino Walters is a 30 y.o. male who presents with Back Pain (x couple weeks, right clay back pain radiates to tailbone)            Back Pain  This is a new problem. Episode onset: pt reports new onset of back pain that started 4 days ago. He states the pain is localized to the right mid back. Pt states his work is really physical and he is not sure if what he does for work aggravates his back. The problem is unchanged. The pain is present in the lumbar spine. The quality of the pain is described as aching. The pain does not radiate. 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 He has tried NSAIDs for the symptoms. The treatment provided mild relief.       Review of Systems   Musculoskeletal: Positive for back pain.   All other systems reviewed and are negative.           Past Medical History:   Diagnosis Date   • Psychiatric disorder     anxiety, depression, schizophrenia, polypsychosis      Past Surgical History:   Procedure Laterality Date   • OTHER      right knee ( torn miniscus )      Social History     Socioeconomic History   • Marital status: Single     Spouse name: Not on file   • Number of children: Not on file   • Years of education: Not on file   • Highest education level: Not on file   Occupational History   • Not on file   Tobacco Use   • Smoking status: Former Smoker   • Smokeless tobacco: Never Used   Vaping Use   • Vaping Use: Every day   • Substances: Nicotine, Flavoring   • Devices: Refillable tank   Substance and Sexual Activity   • Alcohol use: Not Currently   • Drug use: No   • Sexual activity: Not on file   Other Topics Concern   • Not on file   Social History Narrative   • Not on file     Social Determinants of Health     Financial Resource Strain:    • Difficulty of Paying Living Expenses:    Food Insecurity:    • Worried About Running Out of Food in the Last Year:    • Ran Out of Food in the Last  "Year:    Transportation Needs:    • Lack of Transportation (Medical):    • Lack of Transportation (Non-Medical):    Physical Activity:    • Days of Exercise per Week:    • Minutes of Exercise per Session:    Stress:    • Feeling of Stress :    Social Connections:    • Frequency of Communication with Friends and Family:    • Frequency of Social Gatherings with Friends and Family:    • Attends Confucianist Services:    • Active Member of Clubs or Organizations:    • Attends Club or Organization Meetings:    • Marital Status:    Intimate Partner Violence:    • Fear of Current or Ex-Partner:    • Emotionally Abused:    • Physically Abused:    • Sexually Abused:        Objective     /84 (BP Location: Left arm, Patient Position: Sitting, BP Cuff Size: Adult long)   Pulse (!) 119   Temp 36.6 °C (97.9 °F) (Temporal)   Resp 16   Ht 1.791 m (5' 10.5\") Comment: pt stated  Wt 78.5 kg (173 lb)   SpO2 99%   BMI 24.47 kg/m²      Physical Exam  Vitals and nursing note reviewed.   Constitutional:       Appearance: Normal appearance.   HENT:      Head: Normocephalic and atraumatic.      Nose: Nose normal.      Mouth/Throat:      Mouth: Mucous membranes are moist.      Pharynx: Oropharynx is clear.   Eyes:      Extraocular Movements: Extraocular movements intact.      Pupils: Pupils are equal, round, and reactive to light.   Cardiovascular:      Rate and Rhythm: Normal rate and regular rhythm.   Pulmonary:      Effort: Pulmonary effort is normal.   Musculoskeletal:         General: Normal range of motion.      Cervical back: Normal range of motion and neck supple.      Lumbar back: Spasms and tenderness present.        Back:    Skin:     General: Skin is warm and dry.      Capillary Refill: Capillary refill takes less than 2 seconds.   Neurological:      General: No focal deficit present.      Mental Status: He is alert and oriented to person, place, and time. Mental status is at baseline.   Psychiatric:         Mood and " Affect: Mood normal.         Thought Content: Thought content normal.         Judgment: Judgment normal.                  Lab Results   Component Value Date/Time    POCCOLOR Yellow 08/17/2021 10:39 AM    POCAPPEAR Clear 08/17/2021 10:39 AM    POCLEUKEST Negative 08/17/2021 10:39 AM    POCNITRITE Negative 08/17/2021 10:39 AM    POCUROBILIGE 0.2 08/17/2021 10:39 AM    POCPROTEIN Negative 08/17/2021 10:39 AM    POCURPH 5.0 08/17/2021 10:39 AM    POCBLOOD Negative 08/17/2021 10:39 AM    POCSPGRV 1.020 08/17/2021 10:39 AM    POCKETONES Negative 08/17/2021 10:39 AM    POCBILIRUBIN Negative 08/17/2021 10:39 AM    POCGLUCUA Negative 08/17/2021 10:39 AM                 Assessment & Plan        1. Right flank pain  - POCT Urinalysis    2. Lumbar paraspinal muscle spasm  - cyclobenzaprine (FLEXERIL) 5 mg tablet; Take 1-2 Tablets by mouth 3 times a day as needed for Muscle Spasms.  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    Pathogenesis of diagnosis discussed including typical length and natural progression.      Instructed to return to  or nearest emergency department if symptoms fail to improve, for any change in condition, further concerns, or new concerning symptoms.  Patient states understanding of the plan of care and discharge instructions.

## 2021-12-16 ENCOUNTER — OFFICE VISIT (OUTPATIENT)
Dept: URGENT CARE | Facility: CLINIC | Age: 31
End: 2021-12-16
Payer: COMMERCIAL

## 2021-12-16 ENCOUNTER — HOSPITAL ENCOUNTER (OUTPATIENT)
Facility: MEDICAL CENTER | Age: 31
End: 2021-12-16
Attending: NURSE PRACTITIONER
Payer: COMMERCIAL

## 2021-12-16 VITALS
RESPIRATION RATE: 14 BRPM | HEART RATE: 60 BPM | SYSTOLIC BLOOD PRESSURE: 130 MMHG | WEIGHT: 191 LBS | DIASTOLIC BLOOD PRESSURE: 88 MMHG | OXYGEN SATURATION: 96 % | TEMPERATURE: 98.7 F | BODY MASS INDEX: 27.35 KG/M2 | HEIGHT: 70 IN

## 2021-12-16 DIAGNOSIS — J00 ACUTE RHINITIS: ICD-10-CM

## 2021-12-16 DIAGNOSIS — B34.9 VIRAL ILLNESS: ICD-10-CM

## 2021-12-16 DIAGNOSIS — J02.9 SORE THROAT: ICD-10-CM

## 2021-12-16 DIAGNOSIS — R05.9 COUGH: ICD-10-CM

## 2021-12-16 PROCEDURE — 99214 OFFICE O/P EST MOD 30 MIN: CPT | Performed by: NURSE PRACTITIONER

## 2021-12-16 PROCEDURE — U0005 INFEC AGEN DETEC AMPLI PROBE: HCPCS

## 2021-12-16 PROCEDURE — U0003 INFECTIOUS AGENT DETECTION BY NUCLEIC ACID (DNA OR RNA); SEVERE ACUTE RESPIRATORY SYNDROME CORONAVIRUS 2 (SARS-COV-2) (CORONAVIRUS DISEASE [COVID-19]), AMPLIFIED PROBE TECHNIQUE, MAKING USE OF HIGH THROUGHPUT TECHNOLOGIES AS DESCRIBED BY CMS-2020-01-R: HCPCS

## 2021-12-16 ASSESSMENT — ENCOUNTER SYMPTOMS
DIZZINESS: 0
COUGH: 1
VOMITING: 0
NAUSEA: 0
EYE REDNESS: 0
FEVER: 0
HEADACHES: 0
CHILLS: 0
EYE DISCHARGE: 0
NECK PAIN: 0
SORE THROAT: 1

## 2021-12-16 ASSESSMENT — LIFESTYLE VARIABLES: SUBSTANCE_ABUSE: 0

## 2021-12-16 NOTE — PROGRESS NOTES
Maximino Walters is a 31 y.o. male who presents for Congestion (congestion/runny nose, diarrhea, mild sore throat, cough, x5 days symptoms)      HPI this new problem.  Maximino is a 31-year-old male patient presents with congestion, runny nose, diarrhea, mild sore throat and cough for 5 days.  He has no difficulty swallowing.  He is sleeping well.  His girlfriend is sick with similar symptoms.  He has been using over-the-counter DayQuil and NyQuil for the cough and congestion.  He believes it is working pretty well to reduce his symptoms.  He has been using occasional Pepto-Bismol to reduce his GI symptoms of cramping and diarrhea.  He reports that he has had no diarrhea for over 24 hours now.  He denies fever, chills, body aches, shortness of breath.  No other aggravating or alleviating factors.    Review of Systems   Constitutional: Negative for chills, fever and malaise/fatigue.   HENT: Positive for congestion and sore throat.    Eyes: Negative for discharge and redness.   Respiratory: Positive for cough.    Cardiovascular: Negative for chest pain and leg swelling.   Gastrointestinal: Negative for nausea and vomiting.   Musculoskeletal: Negative for neck pain.   Neurological: Negative for dizziness and headaches.   Endo/Heme/Allergies: Negative for environmental allergies.   Psychiatric/Behavioral: Negative for substance abuse.       Allergies:     No Known Allergies    PMSFS Hx:  Past Medical History:   Diagnosis Date   • Psychiatric disorder     anxiety, depression, schizophrenia, polypsychosis     Past Surgical History:   Procedure Laterality Date   • OTHER      right knee ( torn miniscus )     History reviewed. No pertinent family history.  Social History     Tobacco Use   • Smoking status: Former Smoker   • Smokeless tobacco: Never Used   Substance Use Topics   • Alcohol use: Not Currently       Problems:   There is no problem list on file for this patient.      Medications:   Current Outpatient Medications on File  "Prior to Visit   Medication Sig Dispense Refill   • ibuprofen (MOTRIN) 200 MG Tab Take 200 mg by mouth every 6 hours as needed.     • cyclobenzaprine (FLEXERIL) 5 mg tablet Take 1-2 Tablets by mouth 3 times a day as needed for Muscle Spasms. (Patient not taking: Reported on 12/16/2021) 20 Tablet 0     No current facility-administered medications on file prior to visit.          Objective:     /88   Pulse 60   Temp 37.1 °C (98.7 °F)   Resp 14   Ht 1.778 m (5' 10\")   Wt 86.6 kg (191 lb)   SpO2 96%   BMI 27.41 kg/m²     Physical Exam  Vitals and nursing note reviewed.   Constitutional:       General: He is not in acute distress.     Appearance: Normal appearance. He is well-developed and normal weight. He is not ill-appearing or toxic-appearing.   HENT:      Head: Normocephalic.      Right Ear: Hearing, tympanic membrane, ear canal and external ear normal.      Left Ear: Hearing, tympanic membrane, ear canal and external ear normal.      Nose: Mucosal edema and rhinorrhea (Clear) present.      Right Sinus: No maxillary sinus tenderness or frontal sinus tenderness.      Left Sinus: No maxillary sinus tenderness or frontal sinus tenderness.      Mouth/Throat:      Mouth: Mucous membranes are moist.      Pharynx: Uvula midline. No oropharyngeal exudate, posterior oropharyngeal erythema or uvula swelling.      Tonsils: No tonsillar abscesses.   Eyes:      General: Lids are normal.      Conjunctiva/sclera: Conjunctivae normal.      Pupils: Pupils are equal, round, and reactive to light.   Neck:      Trachea: Trachea and phonation normal.   Cardiovascular:      Rate and Rhythm: Normal rate and regular rhythm.      Pulses: Normal pulses.      Heart sounds: Normal heart sounds.   Pulmonary:      Effort: Pulmonary effort is normal.      Breath sounds: Normal breath sounds.   Chest:   Breasts:      Right: No supraclavicular adenopathy.      Left: No supraclavicular adenopathy.       Musculoskeletal:         General: " Normal range of motion.      Cervical back: Full passive range of motion without pain, normal range of motion and neck supple. No muscular tenderness. Normal range of motion.   Lymphadenopathy:      Cervical: No cervical adenopathy.      Upper Body:      Right upper body: No supraclavicular adenopathy.      Left upper body: No supraclavicular adenopathy.   Skin:     General: Skin is warm and dry.      Capillary Refill: Capillary refill takes less than 2 seconds.   Neurological:      Mental Status: He is alert and oriented to person, place, and time.   Psychiatric:         Mood and Affect: Mood normal.         Behavior: Behavior normal. Behavior is cooperative.         Thought Content: Thought content normal.         Assessment /Associated Orders:      1. Viral illness  SARS-CoV-2 PCR (24 hour In-House): Collect NP swab in VTM   2. Cough     3. Sore throat     4. Acute rhinitis         Medical Decision Making:    Pt is clinically stable at today's acute urgent care visit.  No acute distress noted. Appropriate for outpatient management at this time.   Acute problem today with uncertain prognosis.     Discussed that this illness was  most likely viral in nature. Did not see any evidence of a bacterial process. OTC medications can be used for symptomatic relief of symptoms. Follow manufacturers guidelines for dosing and instructions.   COVID PCR- pending   Quarantine per CDC guidelines until results are available.   Keep well hydrated   Humidifier at night prn   OTC antihistamine of choice. Follow Kudarom dosing and safety guidelines.     Advised to follow-up with the primary care provider for recheck, reevaluation, and consideration of further management if necessary.   Discussed management options (risks,benefits, and alternatives to treatment). Expressed understanding and the treatment plan was agreed upon. Questions were encouraged and answered   Return to urgent care prn if new or worsening sx or if there is  no improvement in condition prn.    Educated in Red flags and indications to immediately call 911 or present to the Emergency Department.     I personally reviewed prior external notes and test results pertinent to today's visit.  I have independently reviewed and interpreted all diagnostics ordered during this urgent care acute visit.   Time spent evaluating this patient was at least 30 minutes and includes preparing for visit, counseling/education, exam and evaluation, obtaining history, independent interpretation, ordering lab/test/procedures,medication management and documentation.Time does not include separately billable procedures noted .

## 2021-12-16 NOTE — LETTER
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  A concern for COVID-19 was identified and testing was done.  We are asking that you excuse work absences while following the self-isolation protocol per the Center for Disease Control (CDC) guidelines.  Your employee is able to access the test results through [facility]'s electronic delivery system called [employer].     If the results of testing were negative, and once there has been no fever(temperature greater than 100.4 °F) for at least 48 hours without taking any acetaminophen or ibuprofen, and no vomiting or diarrhea for at least 48 hours, then return to work is approved without restrictions.    If the results of the testing were positive follow the the CDC guidelines with a minimum of 10 days from the onset of symptoms and without fever, vomiting, or diarrhea.    In general repeat testing is not necessary and not offered through [facility].  Repeat testing is also not recommended by the CDC.  This is the only note that will be provided from the [facility] for this illness.       AMERICO Santana  Electronically Signed

## 2023-01-16 ENCOUNTER — OFFICE VISIT (OUTPATIENT)
Dept: URGENT CARE | Facility: CLINIC | Age: 33
End: 2023-01-16
Payer: COMMERCIAL

## 2023-01-16 VITALS
DIASTOLIC BLOOD PRESSURE: 64 MMHG | TEMPERATURE: 98.4 F | HEART RATE: 89 BPM | WEIGHT: 202.4 LBS | HEIGHT: 71 IN | SYSTOLIC BLOOD PRESSURE: 96 MMHG | BODY MASS INDEX: 28.34 KG/M2 | RESPIRATION RATE: 12 BRPM | OXYGEN SATURATION: 99 %

## 2023-01-16 DIAGNOSIS — Z76.89 REFERRAL OF PATIENT: ICD-10-CM

## 2023-01-16 DIAGNOSIS — M62.838 TRAPEZIUS MUSCLE SPASM: ICD-10-CM

## 2023-01-16 DIAGNOSIS — M54.2 NECK PAIN ON RIGHT SIDE: ICD-10-CM

## 2023-01-16 PROCEDURE — 99213 OFFICE O/P EST LOW 20 MIN: CPT | Performed by: STUDENT IN AN ORGANIZED HEALTH CARE EDUCATION/TRAINING PROGRAM

## 2023-01-16 RX ORDER — CYCLOBENZAPRINE HCL 5 MG
5 TABLET ORAL 2 TIMES DAILY PRN
Qty: 15 TABLET | Refills: 0 | Status: SHIPPED | OUTPATIENT
Start: 2023-01-16

## 2023-01-16 RX ORDER — KETOROLAC TROMETHAMINE 30 MG/ML
30 INJECTION, SOLUTION INTRAMUSCULAR; INTRAVENOUS ONCE
Status: COMPLETED | OUTPATIENT
Start: 2023-01-16 | End: 2023-01-16

## 2023-01-16 RX ADMIN — KETOROLAC TROMETHAMINE 30 MG: 30 INJECTION, SOLUTION INTRAMUSCULAR; INTRAVENOUS at 12:42

## 2023-01-16 ASSESSMENT — ENCOUNTER SYMPTOMS
TINGLING: 0
SYNCOPE: 0
CHILLS: 0
PARESIS: 0
PALPITATIONS: 0
NUMBNESS: 0
ABDOMINAL PAIN: 0
TROUBLE SWALLOWING: 0
EYE DISCHARGE: 0
BLURRED VISION: 0
DIARRHEA: 0
FEVER: 0
BACK PAIN: 0
DIZZINESS: 0
LEG PAIN: 0
EYE PAIN: 0
DOUBLE VISION: 0
NECK PAIN: 1
WEIGHT LOSS: 0
NAUSEA: 0
CONSTIPATION: 0
WHEEZING: 0
SENSORY CHANGE: 0
PHOTOPHOBIA: 0
HEADACHES: 0
VOMITING: 0
FOCAL WEAKNESS: 0
WEAKNESS: 0
SHORTNESS OF BREATH: 0
EYE REDNESS: 0
VISUAL CHANGE: 0

## 2023-01-16 NOTE — PROGRESS NOTES
Subjective     Maximino Walters is a 32 y.o. male who presents with Neck Pain (X 1 day. Can't turn to right without hurting. Feels a little when turning to left but not the same. Nothing seems to be helping. Occurred after raising arms above head.)            Maximino is a 32 y.o. male who presents with neck pain.  Neck pain started yesterday.  Patient believes that it started when he was extending his arms overhead to stretch.  He states he felt a twinge located on the right side of his neck and has been experiencing neck pain/deafness since.  Reports no radiation of pain.  No numbness/tingling of upper extremities.  She has taken OTC Tylenol/ibuprofen as well as massage to area which has provided mild relief of symptoms. No injury or trauma other than stretching arms over head.    Neck Pain   This is a new problem. The current episode started yesterday. The problem has been unchanged. Associated with: stretching overhead. The pain is present in the right side. The quality of the pain is described as cramping. The pain is mild. The symptoms are aggravated by twisting and position. Pertinent negatives include no chest pain, fever, headaches, leg pain, numbness, pain with swallowing, paresis, photophobia, syncope, tingling, trouble swallowing, visual change, weakness or weight loss. He has tried acetaminophen and NSAIDs for the symptoms. The treatment provided mild relief.     Review of Systems   Constitutional:  Negative for chills, fever, malaise/fatigue and weight loss.   HENT:  Negative for trouble swallowing.    Eyes:  Negative for blurred vision, double vision, photophobia, pain, discharge and redness.   Respiratory:  Negative for shortness of breath and wheezing.    Cardiovascular:  Negative for chest pain, palpitations and syncope.   Gastrointestinal:  Negative for abdominal pain, constipation, diarrhea, nausea and vomiting.   Musculoskeletal:  Positive for neck pain. Negative for back pain and joint pain.  "  Neurological:  Negative for dizziness, tingling, sensory change, focal weakness, weakness, numbness and headaches.   All other systems reviewed and are negative.           Objective     BP 96/64 (BP Location: Left arm, Patient Position: Sitting, BP Cuff Size: Large adult long)   Pulse 89   Temp 36.9 °C (98.4 °F) (Temporal)   Resp 12   Ht 1.803 m (5' 11\") Comment: per pt  Wt 91.8 kg (202 lb 6.4 oz) Comment: w/ boots and jacket  SpO2 99%   BMI 28.23 kg/m²      Physical Exam  Vitals reviewed.   Constitutional:       General: He is not in acute distress.     Appearance: Normal appearance. He is not ill-appearing or toxic-appearing.   HENT:      Head: Normocephalic and atraumatic.      Mouth/Throat:      Mouth: Mucous membranes are moist.      Pharynx: Oropharynx is clear.   Eyes:      Extraocular Movements: Extraocular movements intact.      Conjunctiva/sclera: Conjunctivae normal.      Pupils: Pupils are equal, round, and reactive to light.   Cardiovascular:      Rate and Rhythm: Normal rate.   Pulmonary:      Effort: Pulmonary effort is normal.   Musculoskeletal:      Cervical back: Spasms present. No swelling, deformity or bony tenderness. Decreased range of motion (with lateral rotation and flexion secondary to pain/discomfort).      Thoracic back: Normal.      Lumbar back: Normal.      Comments: Upper extremities with FROM and equal strength bilaterally.  Neurovascularly intact.   Skin:     General: Skin is warm and dry.   Neurological:      General: No focal deficit present.      Mental Status: He is alert. Mental status is at baseline.      GCS: GCS eye subscore is 4. GCS verbal subscore is 5. GCS motor subscore is 6.      Cranial Nerves: Cranial nerves 2-12 are intact.      Sensory: Sensation is intact.      Motor: Motor function is intact.      Coordination: Coordination is intact.      Gait: Gait is intact.                           Assessment & Plan        1. Neck pain on right side  - ketorolac " (TORADOL) injection 30 mg    2. Trapezius muscle spasm  - cyclobenzaprine (FLEXERIL) 5 mg tablet; Take 1 Tablet by mouth 2 times a day as needed for Mild Pain or Muscle Spasms for up to 15 doses.  Dispense: 15 Tablet; Refill: 0  - ketorolac (TORADOL) injection 30 mg    3. Referral of patient  - Referral to establish with [facility] PCP     Supportive measures include:  Resting the injured area.  Applying heat and cold to the affected area.  Medicines for pain and inflammation, such as OTC NSAIDs/Tyelnol.  Muscle relaxants (I.e. flexeril) may be needed for a short time.  Gentle stretching and range of motion exercise    Treatment with Cyclobenzaprine - Discussed with patient this medication can cause drowsiness/sedation. The patient was instructed to use medication mostly during the evening/night time. Instructed to avoid driving, operating machinery, or drinking alcohol while using this medication. Patient verbalized understanding and agreement.      Differential diagnoses, supportive care, and indications for immediate follow-up discussed with patient. Pathogenesis of diagnosis discussed including typical length and natural progression.      Instructed to return to urgent care or nearest emergency department if symptoms fail to improve, for any change in condition, further concerns, or new concerning symptoms.    Patient states understanding and agrees with the plan of care and discharge instructions.

## 2023-01-16 NOTE — LETTER
January 16, 2023    To Whom It May Concern:         This is confirmation that Maximino Walters attended his scheduled appointment with Cathy Toscano P.A.-C. on 1/16/23.  Please excuse work absences today for medical reasons. Maximino return to work on 1/17/2023 without restrictions.         If you have any questions please do not hesitate to call me at the phone number listed below.    Sincerely,          Cathy Toscano P.A.-C.  [phone]

## 2023-01-25 ENCOUNTER — TELEPHONE (OUTPATIENT)
Dept: HEALTH INFORMATION MANAGEMENT | Facility: OTHER | Age: 33
End: 2023-01-25
Payer: COMMERCIAL

## 2023-04-18 ENCOUNTER — OFFICE VISIT (OUTPATIENT)
Dept: URGENT CARE | Facility: CLINIC | Age: 33
End: 2023-04-18
Payer: COMMERCIAL

## 2023-04-18 VITALS
HEART RATE: 61 BPM | HEIGHT: 71 IN | TEMPERATURE: 97.8 F | WEIGHT: 188 LBS | SYSTOLIC BLOOD PRESSURE: 122 MMHG | BODY MASS INDEX: 26.32 KG/M2 | OXYGEN SATURATION: 98 % | RESPIRATION RATE: 12 BRPM | DIASTOLIC BLOOD PRESSURE: 80 MMHG

## 2023-04-18 DIAGNOSIS — J06.9 VIRAL URI WITH COUGH: ICD-10-CM

## 2023-04-18 PROCEDURE — 99213 OFFICE O/P EST LOW 20 MIN: CPT | Performed by: PHYSICIAN ASSISTANT

## 2023-04-18 RX ORDER — BENZONATATE 200 MG/1
200 CAPSULE ORAL 3 TIMES DAILY PRN
Qty: 30 CAPSULE | Refills: 0 | Status: SHIPPED | OUTPATIENT
Start: 2023-04-18

## 2023-04-18 RX ORDER — ALBUTEROL SULFATE 90 UG/1
2 AEROSOL, METERED RESPIRATORY (INHALATION) EVERY 6 HOURS PRN
Qty: 8.5 G | Refills: 0 | Status: SHIPPED | OUTPATIENT
Start: 2023-04-18

## 2023-04-18 RX ORDER — DEXTROMETHORPHAN HYDROBROMIDE AND PROMETHAZINE HYDROCHLORIDE 15; 6.25 MG/5ML; MG/5ML
5 SYRUP ORAL EVERY 4 HOURS PRN
Qty: 120 ML | Refills: 0 | Status: SHIPPED | OUTPATIENT
Start: 2023-04-18

## 2023-04-18 ASSESSMENT — ENCOUNTER SYMPTOMS
DIZZINESS: 0
EYE PAIN: 0
NAUSEA: 0
CHILLS: 0
MYALGIAS: 1
DIARRHEA: 0
SHORTNESS OF BREATH: 0
COUGH: 1
BLURRED VISION: 0
VOMITING: 0
ABDOMINAL PAIN: 0
FEVER: 0
SORE THROAT: 1
HEADACHES: 1

## 2023-04-18 NOTE — LETTER
April 18, 2023         Patient: Maximino Walters   YOB: 1990   Date of Visit: 4/18/2023           To Whom it May Concern:    Maximino Walters was seen in my clinic on 4/18/2023.  Please excuse his absence from work today.      Sincerely,           Nenita Camacho P.A.-C.  Electronically Signed

## 2023-04-18 NOTE — PROGRESS NOTES
Subjective     Maximino Walters is a 32 y.o. male who presents with Nasal Congestion (X 1 day, chest pain w/cough, runny nose, BA )    HPI:  Maximino Walters is a 32 y.o. male who presents today for evaluation of URI symptoms.  Patient reports that he started to feel sick yesterday.  He actually had to leave work early because he was having so much increased congestion and postnasal drip that he was having a hard time swallowing.  Since then he has continued to have some congestion but is also had cough.  He says he notes some pain/burning in his chest while coughing and he has also had some body aches.  The body aches were worse yesterday.  He has not had any fever or chills.  No sick contacts that he is aware of.  Has a history of childhood asthma but has not had to use any medications for it in quite some time.      Review of Systems   Constitutional:  Positive for malaise/fatigue. Negative for chills and fever.   HENT:  Positive for congestion and sore throat. Negative for ear pain.    Eyes:  Negative for blurred vision and pain.   Respiratory:  Positive for cough. Negative for shortness of breath.         Burning type chest pain while coughing   Gastrointestinal:  Negative for abdominal pain, diarrhea, nausea and vomiting.   Musculoskeletal:  Positive for myalgias.   Skin:  Negative for rash.   Neurological:  Positive for headaches. Negative for dizziness.       PMH:  has a past medical history of Psychiatric disorder.  MEDS:   Current Outpatient Medications:     albuterol 108 (90 Base) MCG/ACT Aero Soln inhalation aerosol, Inhale 2 Puffs every 6 hours as needed for Shortness of Breath., Disp: 8.5 g, Rfl: 0    benzonatate (TESSALON) 200 MG capsule, Take 1 Capsule by mouth 3 times a day as needed for Cough., Disp: 30 Capsule, Rfl: 0    promethazine-dextromethorphan (PROMETHAZINE-DM) 6.25-15 MG/5ML syrup, Take 5 mL by mouth every four hours as needed for Cough., Disp: 120 mL, Rfl: 0    cyclobenzaprine (FLEXERIL) 5 mg  "tablet, Take 1 Tablet by mouth 2 times a day as needed for Mild Pain or Muscle Spasms for up to 15 doses. (Patient not taking: Reported on 4/18/2023), Disp: 15 Tablet, Rfl: 0    ibuprofen (MOTRIN) 200 MG Tab, Take 200 mg by mouth every 6 hours as needed. (Patient not taking: Reported on 4/18/2023), Disp: , Rfl:   ALLERGIES: No Known Allergies  SURGHX:   Past Surgical History:   Procedure Laterality Date    OTHER      right knee ( torn miniscus )     SOCHX:  reports that he has quit smoking. He has never used smokeless tobacco. He reports that he does not currently use alcohol. He reports current drug use. Drug: Marijuana.  FH: Family history was reviewed, no pertinent findings to report      Objective     /80   Pulse 61   Temp 36.6 °C (97.8 °F) (Temporal)   Resp 12   Ht 1.803 m (5' 11\")   Wt 85.3 kg (188 lb)   SpO2 98%   BMI 26.22 kg/m²      Physical Exam  Constitutional:       Appearance: He is well-developed.   HENT:      Head: Normocephalic and atraumatic.      Right Ear: Tympanic membrane, ear canal and external ear normal.      Left Ear: Tympanic membrane, ear canal and external ear normal.      Nose: Mucosal edema and congestion present. No rhinorrhea.      Mouth/Throat:      Lips: Pink.      Mouth: Mucous membranes are moist.      Pharynx: Uvula midline. No oropharyngeal exudate, posterior oropharyngeal erythema or uvula swelling.      Comments: Postnasal drip noted in posterior oropharynx  Eyes:      Conjunctiva/sclera: Conjunctivae normal.      Pupils: Pupils are equal, round, and reactive to light.   Cardiovascular:      Rate and Rhythm: Normal rate and regular rhythm.      Heart sounds: Normal heart sounds. No murmur heard.  Pulmonary:      Effort: Pulmonary effort is normal.      Breath sounds: Normal breath sounds. No decreased breath sounds, wheezing, rhonchi or rales.   Musculoskeletal:      Cervical back: Normal range of motion.   Lymphadenopathy:      Cervical: No cervical adenopathy. "   Skin:     General: Skin is warm and dry.      Capillary Refill: Capillary refill takes less than 2 seconds.   Neurological:      Mental Status: He is alert and oriented to person, place, and time.   Psychiatric:         Behavior: Behavior normal.         Judgment: Judgment normal.           Assessment & Plan     1. Viral URI with cough  - albuterol 108 (90 Base) MCG/ACT Aero Soln inhalation aerosol; Inhale 2 Puffs every 6 hours as needed for Shortness of Breath.  Dispense: 8.5 g; Refill: 0  - benzonatate (TESSALON) 200 MG capsule; Take 1 Capsule by mouth 3 times a day as needed for Cough.  Dispense: 30 Capsule; Refill: 0  - promethazine-dextromethorphan (PROMETHAZINE-DM) 6.25-15 MG/5ML syrup; Take 5 mL by mouth every four hours as needed for Cough.  Dispense: 120 mL; Refill: 0    Symptoms seem most consistent with viral upper respiratory infection.  Lungs are CTA B.  Vital signs stable.  Patient afebrile.  No nidus of bacterial infection noted on today's exam.  Discussed option of testing for COVID but patient would prefer to do an at-home test.  Albuterol inhaler as well as some cough medication sent for symptomatic improvement.  Supportive care also discussed to include the use of saline nasal rinses, steam inhalation, and the use of a cool-mist humidifier in the bedroom at night.  Tylenol or ibuprofen as needed for fever.  Follow-up if symptoms continue to worsen.        Differential Diagnosis, natural history, and supportive care discussed. Return to the Urgent Care or follow up with your PCP if symptoms fail to resolve, or for any new or worsening symptoms. Emergency room precautions discussed. Patient and/or family appears understanding of information.